# Patient Record
Sex: FEMALE | Race: WHITE | NOT HISPANIC OR LATINO | Employment: FULL TIME | URBAN - NONMETROPOLITAN AREA
[De-identification: names, ages, dates, MRNs, and addresses within clinical notes are randomized per-mention and may not be internally consistent; named-entity substitution may affect disease eponyms.]

---

## 2017-02-28 ENCOUNTER — OFFICE VISIT (OUTPATIENT)
Dept: FAMILY MEDICINE CLINIC | Facility: CLINIC | Age: 52
End: 2017-02-28

## 2017-02-28 VITALS
BODY MASS INDEX: 33.99 KG/M2 | OXYGEN SATURATION: 98 % | HEART RATE: 69 BPM | WEIGHT: 204 LBS | HEIGHT: 65 IN | DIASTOLIC BLOOD PRESSURE: 80 MMHG | SYSTOLIC BLOOD PRESSURE: 122 MMHG

## 2017-02-28 DIAGNOSIS — M54.9 ACUTE MIDLINE BACK PAIN, UNSPECIFIED BACK LOCATION: Primary | ICD-10-CM

## 2017-02-28 DIAGNOSIS — M62.830 BACK SPASM: ICD-10-CM

## 2017-02-28 PROCEDURE — 99213 OFFICE O/P EST LOW 20 MIN: CPT | Performed by: FAMILY MEDICINE

## 2017-02-28 RX ORDER — NAPROXEN 375 MG/1
375 TABLET ORAL 2 TIMES DAILY WITH MEALS
Qty: 60 TABLET | Refills: 1 | Status: SHIPPED | OUTPATIENT
Start: 2017-02-28 | End: 2017-08-17

## 2017-02-28 RX ORDER — METHOCARBAMOL 500 MG/1
500 TABLET, FILM COATED ORAL 3 TIMES DAILY
Qty: 42 TABLET | Refills: 1 | Status: SHIPPED | OUTPATIENT
Start: 2017-02-28 | End: 2017-08-17

## 2017-02-28 NOTE — PROGRESS NOTES
Subjective   Jefferson Pena is a 51 y.o. female.     History of Present Illness The patient  presents with complaints of back pain.  It occurs when she stands.  She is having some tightness in her back in the upper lumbar area and lower thoracic area.  No history of injury    The following portions of the patient's history were reviewed and updated as appropriate: allergies, current medications, past family history, past medical history, past social history, past surgical history and problem list.    Review of Systems   Constitutional: Negative for fatigue and fever.   Respiratory: Negative for chest tightness, shortness of breath and wheezing.    Cardiovascular: Negative for chest pain, palpitations and leg swelling.   Musculoskeletal: Positive for back pain and myalgias.       Objective   Physical Exam   Constitutional: She appears well-developed and well-nourished.   HENT:   Head: Normocephalic and atraumatic.   Right Ear: External ear normal.   Left Ear: External ear normal.   Nose: Nose normal.   Mouth/Throat: Oropharynx is clear and moist.   Eyes: EOM are normal. Pupils are equal, round, and reactive to light.   Neck: Normal range of motion. Neck supple.   Cardiovascular: Normal rate, regular rhythm and normal heart sounds.  Exam reveals no gallop and no friction rub.    No murmur heard.  Pulmonary/Chest: Effort normal and breath sounds normal. No respiratory distress. She has no wheezes.   Abdominal: Soft.   Musculoskeletal:   There is tightness in the paraspinous muscles of the lower thoracic and upper lumbar area on the right.  It also hurts in those areas as well.   Skin: Skin is warm and dry.   Vitals reviewed.      Assessment/Plan   Jefferson was seen today for balance issues.    Diagnoses and all orders for this visit:    Acute midline back pain, unspecified back location    Back spasm    Other orders  -     naproxen (NAPROSYN) 375 MG tablet; Take 1 tablet by mouth 2 (Two) Times a Day With Meals.  -      methocarbamol (ROBAXIN) 500 MG tablet; Take 1 tablet by mouth 3 (Three) Times a Day.     Return in 6 weeks.  Back to work on Thurs.

## 2017-04-10 ENCOUNTER — APPOINTMENT (OUTPATIENT)
Dept: GENERAL RADIOLOGY | Facility: HOSPITAL | Age: 52
End: 2017-04-10

## 2017-04-10 ENCOUNTER — HOSPITAL ENCOUNTER (EMERGENCY)
Facility: HOSPITAL | Age: 52
Discharge: HOME | End: 2017-04-10
Attending: FAMILY MEDICINE | Admitting: FAMILY MEDICINE

## 2017-04-10 VITALS
OXYGEN SATURATION: 97 % | HEIGHT: 65 IN | BODY MASS INDEX: 35.99 KG/M2 | TEMPERATURE: 98.1 F | RESPIRATION RATE: 16 BRPM | HEART RATE: 68 BPM | SYSTOLIC BLOOD PRESSURE: 139 MMHG | DIASTOLIC BLOOD PRESSURE: 78 MMHG | WEIGHT: 216 LBS
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
## 2017-04-10 DIAGNOSIS — S40.011A CONTUSION OF RIGHT SHOULDER, INITIAL ENCOUNTER: Primary | ICD-10-CM

## 2017-04-10 PROCEDURE — 99284 EMERGENCY DEPT VISIT MOD MDM: CPT

## 2017-04-10 PROCEDURE — 73030 X-RAY EXAM OF SHOULDER: CPT

## 2017-04-10 RX ORDER — TRAMADOL HYDROCHLORIDE 50 MG/1
50 TABLET ORAL ONCE
Status: COMPLETED | OUTPATIENT
Start: 2017-04-10 | End: 2017-04-10

## 2017-04-10 RX ORDER — MULTIPLE VITAMINS W/ MINERALS TAB 9MG-400MCG
1 TAB ORAL DAILY
COMMUNITY

## 2017-04-10 RX ORDER — TRAMADOL HYDROCHLORIDE 50 MG/1
50 TABLET ORAL EVERY 6 HOURS PRN
Qty: 20 TABLET | Refills: 0 | Status: SHIPPED | OUTPATIENT
Start: 2017-04-10 | End: 2017-08-17

## 2017-04-10 RX ADMIN — TRAMADOL HYDROCHLORIDE 50 MG: 50 TABLET, FILM COATED ORAL at 20:12

## 2017-04-11 ENCOUNTER — OFFICE VISIT (OUTPATIENT)
Dept: FAMILY MEDICINE CLINIC | Facility: CLINIC | Age: 52
End: 2017-04-11

## 2017-04-11 VITALS
HEART RATE: 74 BPM | SYSTOLIC BLOOD PRESSURE: 122 MMHG | WEIGHT: 210 LBS | HEIGHT: 65 IN | OXYGEN SATURATION: 98 % | BODY MASS INDEX: 34.99 KG/M2 | DIASTOLIC BLOOD PRESSURE: 80 MMHG

## 2017-04-11 DIAGNOSIS — M79.671 FOOT PAIN, RIGHT: Primary | ICD-10-CM

## 2017-04-11 DIAGNOSIS — T14.8XXA BRUISE: ICD-10-CM

## 2017-04-11 PROCEDURE — 99213 OFFICE O/P EST LOW 20 MIN: CPT | Performed by: FAMILY MEDICINE

## 2017-04-11 NOTE — PROGRESS NOTES
Subjective   Jefferson Pena is a 52 y.o. female.     History of Present Illness   Patient comes in for check of recent injury at work.  She was seen in the emergency department yesterday.  X-rays of the upper extremity and lower extremity were obtained.  She was still sore.  Her right foot hurts.  She has a bruise around the second toe on the right foot.    The following portions of the patient's history were reviewed and updated as appropriate: allergies, current medications, past family history, past medical history, past social history, past surgical history and problem list.    Review of Systems   Cardiovascular: Negative for chest pain, palpitations and leg swelling.   Musculoskeletal: Positive for arthralgias.       Objective   Physical Exam   Constitutional: She appears well-developed and well-nourished.   HENT:   Head: Normocephalic and atraumatic.   Right Ear: External ear normal.   Left Ear: External ear normal.   Nose: Nose normal.   Mouth/Throat: Oropharynx is clear and moist.   Eyes: EOM are normal. Pupils are equal, round, and reactive to light.   Neck: Normal range of motion.   Cardiovascular: Normal rate, regular rhythm and normal heart sounds.  Exam reveals no gallop and no friction rub.    No murmur heard.  Pulmonary/Chest: Effort normal and breath sounds normal. No respiratory distress. She has no wheezes. She has no rales.   Abdominal: Soft. Bowel sounds are normal.   Musculoskeletal:   Tenderness involving the right shoulder and hip.  There is bruising on the second toe of the right foot.   Vitals reviewed.      Assessment/Plan   Jefferson was seen today for follow-up, back pain and fall.    Diagnoses and all orders for this visit:    Foot pain, right  -     XR Foot 3+ View Right; Future    Bruise     I will contact with x-ray.She is to return to work on Thursday the 11 of April.

## 2017-04-11 NOTE — ED PROVIDER NOTES
Subjective   Patient is a 52 y.o. female presenting with fall.   Fall   Mechanism of injury: fall    Injury location:  Leg and shoulder/arm  Shoulder/arm injury location:  R shoulder  Leg injury location:  R upper leg  Incident location:  Work  Time since incident:  5 hours  Arrived directly from scene: no    Fall:     Fall occurred:  Down stairs (3-4 stairs)    Impact surface:  Hard floor  Suspicion of alcohol use: no    Suspicion of drug use: no    Associated symptoms: no abdominal pain, no back pain, no blindness, no chest pain, no difficulty breathing, no headaches, no hearing loss, no loss of consciousness, no nausea, no neck pain, no seizures and no vomiting        Review of Systems   Constitutional: Negative for appetite change, chills, diaphoresis, fatigue and fever.   HENT: Negative for congestion, ear discharge, ear pain, hearing loss, nosebleeds, rhinorrhea, sinus pressure, sore throat and trouble swallowing.    Eyes: Negative for blindness, discharge and redness.   Respiratory: Negative for apnea, cough, chest tightness, shortness of breath and wheezing.    Cardiovascular: Negative for chest pain.   Gastrointestinal: Negative for abdominal pain, diarrhea, nausea and vomiting.   Endocrine: Negative for polyuria.   Genitourinary: Negative for dysuria, frequency and urgency.   Musculoskeletal: Positive for myalgias. Negative for back pain and neck pain.   Skin: Negative for color change and rash.   Allergic/Immunologic: Negative for immunocompromised state.   Neurological: Negative for dizziness, seizures, loss of consciousness, syncope, weakness, light-headedness and headaches.   Hematological: Negative for adenopathy. Does not bruise/bleed easily.   Psychiatric/Behavioral: Negative for behavioral problems and confusion.   All other systems reviewed and are negative.      Past Medical History:   Diagnosis Date   • Allergic rhinitis    • Asymptomatic varicose veins of bilateral lower extremities    •  Cellulitis    • Cough    • Elevated blood pressure reading without diagnosis of hypertension    • Encounter for surgical aftercare following surgery on the circulatory system     LLE Stab Phlebectomies (24) 03/25/16      • H/O mammogram 07/14/2005    may represent a small cysy or a fibroadenoma,asymmetric density represent normal parenchyma   • Hordeolum externum right lower eyelid    • Obesity    • Other mucopurulent conjunctivitis, right eye    • Pain In Left Leg    • Peripheral venous insufficiency    • Upper respiratory infection    • Varicose veins of left lower leg with ulcer and inflammation    • Venous insufficiency     chronic) (peripheral) - Wears Compression stockings          Allergies   Allergen Reactions   • Penicillins    • Sulfa Antibiotics        Past Surgical History:   Procedure Laterality Date   • COLOSTOMY     • HYSTERECTOMY     • PAP SMEAR  06/03/2004    normal   • SKIN BIOPSY     • VASCULAR SURGERY  02/02/2016   • VEIN SURGERY  03/25/2016    Left lower extremity stab phlebectomies   • WRIST SURGERY  06/02/1982    excision, ganglion cyst,dorsum of the right        Family History   Problem Relation Age of Onset   • Hypertension Mother    • Hypertension Father    • Breast cancer Maternal Grandmother    • Stroke Other    • Hyperlipidemia Other    • Heart disease Other    • Thyroid disease Other        Social History     Social History   • Marital status:      Spouse name: N/A   • Number of children: N/A   • Years of education: N/A     Social History Main Topics   • Smoking status: Never Smoker   • Smokeless tobacco: Never Used   • Alcohol use No   • Drug use: No   • Sexual activity: Not Asked     Other Topics Concern   • None     Social History Narrative   • None           Objective   Physical Exam   Constitutional: She is oriented to person, place, and time. She appears well-developed and well-nourished.   HENT:   Head: Normocephalic and atraumatic.   Nose: Nose normal.   Mouth/Throat:  Oropharynx is clear and moist.   Eyes: Conjunctivae and EOM are normal. Pupils are equal, round, and reactive to light. Right eye exhibits no discharge. Left eye exhibits no discharge. No scleral icterus.   Neck: Normal range of motion. Neck supple. No tracheal deviation present.   Cardiovascular: Normal rate, regular rhythm and normal heart sounds.    No murmur heard.  Pulmonary/Chest: Effort normal and breath sounds normal. No stridor. No respiratory distress. She has no wheezes. She has no rales.   Abdominal: Soft. Bowel sounds are normal. She exhibits no distension and no mass. There is no tenderness. There is no rebound and no guarding.   Musculoskeletal: She exhibits tenderness. She exhibits no edema.        Right shoulder: She exhibits tenderness.   Neurological: She is alert and oriented to person, place, and time. Coordination normal.   Skin: Skin is warm and dry. No rash noted. No erythema.   Psychiatric: She has a normal mood and affect. Her behavior is normal. Thought content normal.   Nursing note and vitals reviewed.      Procedures         ED Course  ED Course          Labs Reviewed - No data to display    XR Shoulder 2+ View Right   Final Result   CONCLUSION: No acute bony abnormality.      Electronically signed by:  Laron Kunz  4/10/2017 9:13 PM   CDT Workstation: [id number]                    [facility]    Final diagnoses:   Contusion of right shoulder, initial encounter   Fall, initial encounter            Florencio Lopez MD  04/10/17 [id number]

## 2017-04-20 ENCOUNTER — APPOINTMENT (OUTPATIENT)
Dept: LAB | Facility: HOSPITAL | Age: 52
End: 2017-04-20

## 2017-04-20 ENCOUNTER — OFFICE VISIT (OUTPATIENT)
Dept: FAMILY MEDICINE CLINIC | Facility: CLINIC | Age: 52
End: 2017-04-20

## 2017-04-20 VITALS
HEART RATE: 76 BPM | BODY MASS INDEX: 34.82 KG/M2 | DIASTOLIC BLOOD PRESSURE: 80 MMHG | OXYGEN SATURATION: 96 % | HEIGHT: 65 IN | WEIGHT: 209 LBS | SYSTOLIC BLOOD PRESSURE: 122 MMHG

## 2017-04-20 DIAGNOSIS — S89.92XS KNEE INJURY, LEFT, SEQUELA: ICD-10-CM

## 2017-04-20 DIAGNOSIS — Z12.11 COLON CANCER SCREENING: Primary | ICD-10-CM

## 2017-04-20 DIAGNOSIS — S89.91XS KNEE INJURY, RIGHT, SEQUELA: ICD-10-CM

## 2017-04-20 DIAGNOSIS — S49.92XD SHOULDER INJURY, LEFT, SUBSEQUENT ENCOUNTER: ICD-10-CM

## 2017-04-20 LAB
ALBUMIN SERPL-MCNC: 4.6 G/DL (ref 3.4–4.8)
ALBUMIN/GLOB SERPL: 1.3 G/DL (ref 1.1–1.8)
ALP SERPL-CCNC: 93 U/L (ref 38–126)
ALT SERPL W P-5'-P-CCNC: 18 U/L (ref 9–52)
ANION GAP SERPL CALCULATED.3IONS-SCNC: 14 MMOL/L (ref 5–15)
AST SERPL-CCNC: 40 U/L (ref 14–36)
BASOPHILS # BLD AUTO: 0.03 10*3/MM3 (ref 0–0.2)
BASOPHILS NFR BLD AUTO: 0.4 % (ref 0–2)
BILIRUB SERPL-MCNC: 0.6 MG/DL (ref 0.2–1.3)
BUN BLD-MCNC: 22 MG/DL (ref 7–21)
BUN/CREAT SERPL: 21.8 (ref 7–25)
CALCIUM SPEC-SCNC: 8.9 MG/DL (ref 8.4–10.2)
CHLORIDE SERPL-SCNC: 103 MMOL/L (ref 95–110)
CO2 SERPL-SCNC: 23 MMOL/L (ref 22–31)
CREAT BLD-MCNC: 1.01 MG/DL (ref 0.5–1)
DEPRECATED RDW RBC AUTO: 48.7 FL (ref 36.4–46.3)
EOSINOPHIL # BLD AUTO: 0.19 10*3/MM3 (ref 0–0.7)
EOSINOPHIL NFR BLD AUTO: 2.5 % (ref 0–7)
ERYTHROCYTE [DISTWIDTH] IN BLOOD BY AUTOMATED COUNT: 14.3 % (ref 11.5–14.5)
GFR SERPL CREATININE-BSD FRML MDRD: 58 ML/MIN/1.73 (ref 51–120)
GLOBULIN UR ELPH-MCNC: 3.5 GM/DL (ref 2.3–3.5)
GLUCOSE BLD-MCNC: 94 MG/DL (ref 60–100)
HCT VFR BLD AUTO: 36.4 % (ref 35–45)
HGB BLD-MCNC: 12 G/DL (ref 12–15.5)
IMM GRANULOCYTES # BLD: 0.01 10*3/MM3 (ref 0–0.02)
IMM GRANULOCYTES NFR BLD: 0.1 % (ref 0–0.5)
LYMPHOCYTES # BLD AUTO: 1.84 10*3/MM3 (ref 0.6–4.2)
LYMPHOCYTES NFR BLD AUTO: 24.2 % (ref 10–50)
MCH RBC QN AUTO: 30.5 PG (ref 26.5–34)
MCHC RBC AUTO-ENTMCNC: 33 G/DL (ref 31.4–36)
MCV RBC AUTO: 92.6 FL (ref 80–98)
MONOCYTES # BLD AUTO: 0.47 10*3/MM3 (ref 0–0.9)
MONOCYTES NFR BLD AUTO: 6.2 % (ref 0–12)
NEUTROPHILS # BLD AUTO: 5.06 10*3/MM3 (ref 2–8.6)
NEUTROPHILS NFR BLD AUTO: 66.6 % (ref 37–80)
PLATELET # BLD AUTO: 201 10*3/MM3 (ref 150–450)
PMV BLD AUTO: 12.4 FL (ref 8–12)
POTASSIUM BLD-SCNC: 3.8 MMOL/L (ref 3.5–5.1)
PROT SERPL-MCNC: 8.1 G/DL (ref 6.3–8.6)
RBC # BLD AUTO: 3.93 10*6/MM3 (ref 3.77–5.16)
SODIUM BLD-SCNC: 140 MMOL/L (ref 137–145)
WBC NRBC COR # BLD: 7.6 10*3/MM3 (ref 3.2–9.8)

## 2017-04-20 PROCEDURE — 80053 COMPREHEN METABOLIC PANEL: CPT | Performed by: FAMILY MEDICINE

## 2017-04-20 PROCEDURE — 85025 COMPLETE CBC W/AUTO DIFF WBC: CPT | Performed by: FAMILY MEDICINE

## 2017-04-20 PROCEDURE — 36415 COLL VENOUS BLD VENIPUNCTURE: CPT | Performed by: FAMILY MEDICINE

## 2017-04-20 PROCEDURE — 99214 OFFICE O/P EST MOD 30 MIN: CPT | Performed by: FAMILY MEDICINE

## 2017-04-20 NOTE — PATIENT INSTRUCTIONS
Return in 3 months.  Contact office if no contact from Dr. Lance's office.  Continue with current meds.

## 2017-04-20 NOTE — PROGRESS NOTES
Subjective   Jefferson Pena is a 52 y.o. female.     History of Present Illness   The patient is in for check of shoulder and leg pain.  She had fallen and injured herself.    The following portions of the patient's history were reviewed and updated as appropriate: allergies, current medications, past family history, past medical history, past social history, past surgical history and problem list.    Review of Systems   Constitutional: Negative for fatigue and fever.   Respiratory: Negative for cough, chest tightness and stridor.    Cardiovascular: Negative for chest pain, palpitations and leg swelling.   Musculoskeletal: Positive for arthralgias and joint swelling. Negative for back pain, gait problem and myalgias.       Objective   Physical Exam   Constitutional: She appears well-developed and well-nourished.   HENT:   Head: Normocephalic and atraumatic.   Right Ear: External ear normal.   Left Ear: External ear normal.   Nose: Nose normal.   Mouth/Throat: Oropharynx is clear and moist.   Eyes: EOM are normal. Pupils are equal, round, and reactive to light.   Neck: Normal range of motion.   Cardiovascular: Normal rate, regular rhythm and normal heart sounds.  Exam reveals no gallop and no friction rub.    No murmur heard.  Pulmonary/Chest: Effort normal and breath sounds normal. No respiratory distress. She has no wheezes. She has no rales.   Abdominal: Soft. Bowel sounds are normal. She exhibits no distension. There is no tenderness.   Musculoskeletal:   There is some bruising on the left knee that goes back to the time of injury.  Both knees are  but better than previous exams.  The shoulder has good range of motion . It is still sore.   Vitals reviewed.      Assessment/Plan   Jefferson was seen today for follow-up.    Diagnoses and all orders for this visit:    Colon cancer screening  -     Ambulatory Referral to Gastroenterology    Shoulder injury, left, subsequent encounter    Knee injury, right,  sequela    Knee injury, left, sequela    Referral for colonoscopy with Dr. Lance as per patient preference.  Continue with ice for swelling as discussed.  Return in 3 months.

## 2017-04-25 ENCOUNTER — OFFICE VISIT (OUTPATIENT)
Dept: FAMILY MEDICINE CLINIC | Facility: CLINIC | Age: 52
End: 2017-04-25

## 2017-04-25 VITALS
DIASTOLIC BLOOD PRESSURE: 80 MMHG | HEART RATE: 81 BPM | OXYGEN SATURATION: 98 % | BODY MASS INDEX: 34.82 KG/M2 | SYSTOLIC BLOOD PRESSURE: 122 MMHG | WEIGHT: 209 LBS | HEIGHT: 65 IN

## 2017-04-25 DIAGNOSIS — M25.362 UNSTABLE KNEE, LEFT: ICD-10-CM

## 2017-04-25 DIAGNOSIS — M25.562 ACUTE PAIN OF LEFT KNEE: Primary | ICD-10-CM

## 2017-04-25 PROCEDURE — 99213 OFFICE O/P EST LOW 20 MIN: CPT | Performed by: FAMILY MEDICINE

## 2017-04-25 NOTE — PROGRESS NOTES
Subjective   Jefferson Pena is a 52 y.o. female.     History of Present Illness The patient comes in check of her left leg.  She states it is giving out on her and is swollen and tender.She had fallen recently and injured the other knee.    The following portions of the patient's history were reviewed and updated as appropriate: allergies, current medications, past family history, past medical history, past social history, past surgical history and problem list.    Review of Systems   Musculoskeletal: Positive for arthralgias, joint swelling and myalgias.       Objective   Physical Exam   Constitutional: She appears well-developed and well-nourished.   HENT:   Head: Normocephalic and atraumatic.   Right Ear: External ear normal.   Left Ear: External ear normal.   Nose: Nose normal.   Mouth/Throat: Oropharynx is clear and moist.   Eyes: EOM are normal. Pupils are equal, round, and reactive to light.   Neck: Normal range of motion.   Cardiovascular: Normal rate, regular rhythm and normal heart sounds.  Exam reveals no gallop and no friction rub.    No murmur heard.  Pulmonary/Chest: Effort normal and breath sounds normal. No respiratory distress. She has no wheezes. She has no rales.   Abdominal: Soft. Bowel sounds are normal. She exhibits no distension. There is no tenderness.   Musculoskeletal:   Left knee is mildly tender. It feels like it gives on pulling forward..   Vitals reviewed.      Assessment/Plan   Jefferson was seen today for leg pain and leg swelling.    Diagnoses and all orders for this visit:    Acute pain of left knee  -     XR Knee 3 View Left; Future    Unstable knee, left    Appt. With Dr. Espinoza on 04/27/17 at 1:30  Will contact with result. Off work for one week.

## 2017-04-27 ENCOUNTER — OFFICE VISIT (OUTPATIENT)
Dept: ORTHOPEDIC SURGERY | Facility: CLINIC | Age: 52
End: 2017-04-27

## 2017-04-27 VITALS
BODY MASS INDEX: 34.82 KG/M2 | SYSTOLIC BLOOD PRESSURE: 126 MMHG | HEIGHT: 65 IN | DIASTOLIC BLOOD PRESSURE: 80 MMHG | WEIGHT: 209 LBS

## 2017-04-27 DIAGNOSIS — M25.562 ACUTE PAIN OF LEFT KNEE: Primary | ICD-10-CM

## 2017-04-27 PROCEDURE — 99203 OFFICE O/P NEW LOW 30 MIN: CPT | Performed by: ORTHOPAEDIC SURGERY

## 2017-04-27 RX ORDER — MELOXICAM 15 MG/1
15 TABLET ORAL DAILY
Qty: 30 TABLET | Refills: 3 | Status: SHIPPED | OUTPATIENT
Start: 2017-04-27 | End: 2017-08-17

## 2017-04-27 NOTE — PROGRESS NOTES
Subjective   Jefferson Pena is a 52 y.o. female. Left knee injury-WORK[employer]    History of Present Illness Patient is an employee at [employer]. Patient states that she fell down stairs at work on 4-. Patient was seen by Dr. Perez and referred here. Patient states that her left knee joint feels unstable when she is getting up and down.     The following portions of the patient's history were reviewed and updated as appropriate:   She  has a past medical history of Allergic rhinitis; Asymptomatic varicose veins of bilateral lower extremities; Cellulitis; Cough; Elevated blood pressure reading without diagnosis of hypertension; Encounter for surgical aftercare following surgery on the circulatory system; H/O mammogram (07/14/2005); Hordeolum externum right lower eyelid; Obesity; Other mucopurulent conjunctivitis, right eye; Pain In Left Leg; Peripheral venous insufficiency; Upper respiratory infection; Varicose veins of left lower leg with ulcer and inflammation; and Venous insufficiency.  She  does not have any pertinent problems on file.  She  has a past surgical history that includes Skin biopsy; Colostomy; Vascular surgery (02/02/2016); Pap Smear (06/03/2004); Vein Surgery (03/25/2016); Wrist surgery (06/02/1982); and Hysterectomy.  Her family history includes Breast cancer in her maternal grandmother; Heart disease in her other; Hyperlipidemia in her other; Hypertension in her father and mother; Stroke in her other; Thyroid disease in her other.  She  reports that she has never smoked. She has never used smokeless tobacco. She reports that she does not drink alcohol or use illicit drugs.  Current Outpatient Prescriptions   Medication Sig Dispense Refill   • methocarbamol (ROBAXIN) 500 MG tablet Take 1 tablet by mouth 3 (Three) Times a Day. 42 tablet 1   • Multiple Vitamins-Minerals (MULTIVITAMIN WITH MINERALS) tablet tablet Take 1 tablet by mouth Daily.     • naproxen (NAPROSYN) 375 MG  "tablet Take 1 tablet by mouth 2 (Two) Times a Day With Meals. 60 tablet 1   • traMADol (ULTRAM) 50 MG tablet Take 1 tablet by mouth Every 6 (Six) Hours As Needed for Moderate Pain (4-6). 20 tablet 0   • meloxicam (MOBIC) 15 MG tablet Take 1 tablet by mouth Daily. Take with meal daily 30 tablet 3     No current facility-administered medications for this visit.      Current Outpatient Prescriptions on File Prior to Visit   Medication Sig   • methocarbamol (ROBAXIN) 500 MG tablet Take 1 tablet by mouth 3 (Three) Times a Day.   • Multiple Vitamins-Minerals (MULTIVITAMIN WITH MINERALS) tablet tablet Take 1 tablet by mouth Daily.   • naproxen (NAPROSYN) 375 MG tablet Take 1 tablet by mouth 2 (Two) Times a Day With Meals.   • traMADol (ULTRAM) 50 MG tablet Take 1 tablet by mouth Every 6 (Six) Hours As Needed for Moderate Pain (4-6).     No current facility-administered medications on file prior to visit.      She is allergic to penicillins and sulfa antibiotics..    Review of Systems  /80  Ht 65\" (165.1 cm)  Wt 209 lb (94.8 kg)  BMI 34.78 kg/m2    Social History     Social History   • Marital status:      Spouse name: N/A   • Number of children: N/A   • Years of education: N/A     Occupational History   • Not on file.     Social History Main Topics   • Smoking status: Never Smoker   • Smokeless tobacco: Never Used   • Alcohol use No   • Drug use: No   • Sexual activity: Not on file     Other Topics Concern   • Not on file     Social History Narrative       HEENT: Normocephalic.  PERRLA.  TM's clear bilaterally.  Oropharynx: Clear.  Neck: Supple, with no adenopathy.  Chest: Equal bilateral expansion.  Clear to auscultation and percussion.  Heart: Regular sinus rhythm, S1 and S2 normal.  No murmurs or extra heart sounds heard.  Abdomen: Soft, nontender, and no organomegaly.  Neurological: cranial nerves II-XII normal Vascular: pulses are present  Dermatological: no rashes  or blemishes, or any abnormality of " the skin.    REVIEW OF SYSTEMS:  Negative, other than presenting complaint.  HEENT: No headaches, diplopia, blurred vision, tinnitus, vertigo, epistaxis, hoarseness or sore throat.  Pulmonary: No cough, sputum, hemoptysis, dyspnea, wheezing, or chest pain.  Cardiac: No chest pain, palpitations, orthopnea, paroxysmal nocturnal dyspnea, shortness of breath, or pedal edema.  Gastrointestinal: No diarrhea, melena, or constipation.  Genitourinary: No dysuria, hematuria, nocturia, frequency, bladder or bowel incontinence.  Hematology: No history of any anemia, fatigue, fever, or chills or night sweats.  Dermatology: No rashes, pruritus, or increased pigmentation changes of the skin.   Objective   Physical Exam exam of the left knee shows crepitus and discomfort on palpation of the medial  Joint line, negative [facility]'s  Neuro intact. Quads:4/5    Assessment/Plan sprain of the knee with exacerbation of OA  Of the knee joint. Would like to start patient on meloxicam , begin PT and pre-cer patient for synvisc injection of the knee  To alleviate the inflammation due to the injury and improve her function and ability to continue working.    Jefferson was seen today for pain.    Diagnoses and all orders for this visit:    Acute pain of left knee  -     meloxicam (MOBIC) 15 MG tablet; Take 1 tablet by mouth Daily. Take with meal daily  -     Ambulatory Referral to Physical Therapy Evaluate and treat

## 2017-05-04 ENCOUNTER — HOSPITAL ENCOUNTER (OUTPATIENT)
Dept: PHYSICAL THERAPY | Facility: HOSPITAL | Age: 52
Setting detail: THERAPIES SERIES
Discharge: HOME | End: 2017-05-04

## 2017-05-04 ENCOUNTER — APPOINTMENT (OUTPATIENT)
Dept: PHYSICAL THERAPY | Facility: HOSPITAL | Age: 52
End: 2017-05-04

## 2017-05-04 DIAGNOSIS — M25.562 ACUTE PAIN OF LEFT KNEE: Primary | ICD-10-CM

## 2017-05-04 PROCEDURE — 97161 PT EVAL LOW COMPLEX 20 MIN: CPT | Performed by: PHYSICAL THERAPIST

## 2017-05-04 PROCEDURE — 97110 THERAPEUTIC EXERCISES: CPT | Performed by: PHYSICAL THERAPIST

## 2017-05-09 ENCOUNTER — HOSPITAL ENCOUNTER (OUTPATIENT)
Dept: PHYSICAL THERAPY | Facility: HOSPITAL | Age: 52
Setting detail: THERAPIES SERIES
Discharge: HOME | End: 2017-05-09

## 2017-05-09 DIAGNOSIS — M25.562 ACUTE PAIN OF LEFT KNEE: Primary | ICD-10-CM

## 2017-05-09 PROCEDURE — 97110 THERAPEUTIC EXERCISES: CPT | Performed by: PHYSICAL THERAPIST

## 2017-05-11 ENCOUNTER — APPOINTMENT (OUTPATIENT)
Dept: PHYSICAL THERAPY | Facility: HOSPITAL | Age: 52
End: 2017-05-11

## 2017-05-12 ENCOUNTER — CLINICAL SUPPORT (OUTPATIENT)
Dept: ORTHOPEDIC SURGERY | Facility: CLINIC | Age: 52
End: 2017-05-12

## 2017-05-12 VITALS
WEIGHT: 210 LBS | SYSTOLIC BLOOD PRESSURE: 126 MMHG | DIASTOLIC BLOOD PRESSURE: 80 MMHG | HEIGHT: 65 IN | BODY MASS INDEX: 34.99 KG/M2

## 2017-05-12 DIAGNOSIS — M17.12 PRIMARY OSTEOARTHRITIS OF LEFT KNEE: ICD-10-CM

## 2017-05-12 DIAGNOSIS — M25.562 ACUTE PAIN OF LEFT KNEE: Primary | ICD-10-CM

## 2017-05-12 PROCEDURE — 99024 POSTOP FOLLOW-UP VISIT: CPT | Performed by: ORTHOPAEDIC SURGERY

## 2017-05-12 PROCEDURE — 20610 DRAIN/INJ JOINT/BURSA W/O US: CPT | Performed by: ORTHOPAEDIC SURGERY

## 2017-05-15 ENCOUNTER — HOSPITAL ENCOUNTER (OUTPATIENT)
Dept: PHYSICAL THERAPY | Facility: HOSPITAL | Age: 52
Setting detail: THERAPIES SERIES
Discharge: HOME | End: 2017-05-15

## 2017-05-15 DIAGNOSIS — M25.562 ACUTE PAIN OF LEFT KNEE: Primary | ICD-10-CM

## 2017-05-15 PROCEDURE — 97110 THERAPEUTIC EXERCISES: CPT

## 2017-05-15 PROCEDURE — G0283 ELEC STIM OTHER THAN WOUND: HCPCS

## 2017-05-17 ENCOUNTER — HOSPITAL ENCOUNTER (OUTPATIENT)
Dept: PHYSICAL THERAPY | Facility: HOSPITAL | Age: 52
Setting detail: THERAPIES SERIES
Discharge: HOME | End: 2017-05-17

## 2017-05-17 DIAGNOSIS — M25.562 ACUTE PAIN OF LEFT KNEE: Primary | ICD-10-CM

## 2017-05-17 PROCEDURE — 97110 THERAPEUTIC EXERCISES: CPT

## 2017-05-23 ENCOUNTER — HOSPITAL ENCOUNTER (OUTPATIENT)
Dept: PHYSICAL THERAPY | Facility: HOSPITAL | Age: 52
Setting detail: THERAPIES SERIES
Discharge: HOME | End: 2017-05-23

## 2017-05-23 DIAGNOSIS — M25.562 ACUTE PAIN OF LEFT KNEE: Primary | ICD-10-CM

## 2017-05-23 PROCEDURE — 97110 THERAPEUTIC EXERCISES: CPT

## 2017-05-25 ENCOUNTER — HOSPITAL ENCOUNTER (OUTPATIENT)
Dept: PHYSICAL THERAPY | Facility: HOSPITAL | Age: 52
Setting detail: THERAPIES SERIES
Discharge: HOME | End: 2017-05-25

## 2017-05-25 DIAGNOSIS — M25.562 ACUTE PAIN OF LEFT KNEE: Primary | ICD-10-CM

## 2017-05-25 PROCEDURE — 97110 THERAPEUTIC EXERCISES: CPT

## 2017-06-01 ENCOUNTER — HOSPITAL ENCOUNTER (OUTPATIENT)
Dept: PHYSICAL THERAPY | Facility: HOSPITAL | Age: 52
Setting detail: THERAPIES SERIES
Discharge: HOME | End: 2017-06-01

## 2017-06-01 DIAGNOSIS — M25.562 ACUTE PAIN OF LEFT KNEE: Primary | ICD-10-CM

## 2017-06-01 PROCEDURE — 97110 THERAPEUTIC EXERCISES: CPT | Performed by: PHYSICAL THERAPIST

## 2017-06-01 NOTE — THERAPY RE-EVALUATION
Outpatient Physical Therapy Ortho Progress Note  [facility] [place]  Nini Oneil, PT       Patient Name: Jefferson Pena  : 1965  MRN: [id number]  Today's Date: 2017      Visit Date: 2017     Subjective Improvement: 50%      Attendance:   Approved:  12 visits         MD follow up:    date:     17      Patient Active Problem List   Diagnosis   • Acute pain of left knee        Past Medical History:   Diagnosis Date   • Allergic rhinitis    • Asymptomatic varicose veins of bilateral lower extremities    • Cellulitis    • Cough    • Elevated blood pressure reading without diagnosis of hypertension    • Encounter for surgical aftercare following surgery on the circulatory system     LLE Stab Phlebectomies (24) 16      • H/O mammogram 2005    may represent a small cysy or a fibroadenoma,asymmetric density represent normal parenchyma   • Hordeolum externum right lower eyelid    • Obesity    • Other mucopurulent conjunctivitis, right eye    • Pain In Left Leg    • Peripheral venous insufficiency    • Upper respiratory infection    • Varicose veins of left lower leg with ulcer and inflammation    • Venous insufficiency     chronic) (peripheral) - Wears Compression stockings           Past Surgical History:   Procedure Laterality Date   • COLOSTOMY     • HYSTERECTOMY     • PAP SMEAR  2004    normal   • SKIN BIOPSY     • VASCULAR SURGERY  2016   • VEIN SURGERY  2016    Left lower extremity stab phlebectomies   • WRIST SURGERY  1982    excision, ganglion cyst,dorsum of the right        Visit Dx:     ICD-10-CM ICD-9-CM   1. Acute pain of left knee M25.562 719.46                 PT Ortho       17 1500    Precautions and Contraindications    Precautions/Limitations no known precautions/limitations  -KG    Subjective Pain    Able to rate subjective pain? yes  -KG    Pre-Treatment Pain Level 0  -KG    Post-Treatment Pain Level 0  -KG     Posture/Observations    Posture/Observations Comments No signs of acute distress. Mild antalgic noted with no AD. Decreased stance time LLE. Pt unsteady with first steps after standing from sitting.  -KG    Sensation    Sensation WNL? WNL  -KG    Light Touch No apparent deficits  -KG    Knee Palpation    Patella Left:;Tender  -KG    Pes Anserine Bursa Left:;Tender  -KG    Medial Joint Line Left:;Tender  -KG    Quads Left:;Tender   Distal insertion  -KG    Patellar Accessory Motions    Patellar Accessory Motions Tested? Yes  -KG    Superior glide WNL  -KG    Inferior glide WNL  -KG    Medial glide WNL  -KG    Lateral glide WNL  -KG    Left Knee    Extension/Flexion AROM Deficit 0-115 deg  -KG    MMT (Manual Muscle Testing)    General MMT Assessment Detail Continues to have slight quad lag with active SLR  -KG    Left Hip    Hip Flexion Gross Movement (4/5) good  -KG    Hip ABduction Gross Movement (4+/5) good plus  -KG    Hip ADduction Gross Movement (5/5) normal  -KG    Left Knee    Knee Extension Gross Movement (4+/5) good plus  -KG    Knee Flexion Gross Movement (4+/5) good plus  -KG      User Key  (r) = Recorded By, (t) = Taken By, (c) = Cosigned By    Initials Name Provider Type    KG Nini Oneil, PT Physical Therapist                            Therapy Education       06/01/17 1500          Therapy Education    Given HEP;Symptoms/condition management  -KG      Program Reinforced  -KG      How Provided Verbal  -KG      Provided to Patient  -KG      Level of Understanding Verbalized;Demonstrated  -KG        User Key  (r) = Recorded By, (t) = Taken By, (c) = Cosigned By    Initials Name Provider Type    KG Nini Oneil, PT Physical Therapist                PT OP Goals       06/01/17 1500       PT Short Term Goals    STG Date to Achieve 05/18/17  -KG     STG 1 Indep in HEP  -KG     STG 1 Progress Met  -KG     STG 2 Demonstrate L hip flex/abd strength to 5/5  -KG     STG 2 Progress Not Met  -KG     STG 3  Demonstrate L knee ext AROM to 0 deg  -KG     STG 3 Progress Met  -KG     STG 4 Demonstrate L knee flex AROM to 120 deg or greater, no pain at end range  -KG     STG 4 Progress Not Met  -KG     STG 5 Demonstrate L  knee flex/ext MMT to 5/5  -KG     STG 5 Progress Not Met  -KG     STG 6 Ascend/descend 1 flight of stairs, step-to pattern with descent, no increased pain  -KG     STG 6 Progress New  -KG     Time Calculation    PT Goal Re-Cert Due Date 06/22/17  -KG       User Key  (r) = Recorded By, (t) = Taken By, (c) = Cosigned By    Initials Name Provider Type    KG Nini Oneil, PT Physical Therapist                PT Assessment/Plan       06/01/17 1500       PT Assessment    Functional Limitations Impaired gait;Limitation in home management;Performance in leisure activities;Performance in work activities  -KG     Impairments Balance;Gait;Muscle strength;Pain;Range of motion  -KG     Assessment Comments Pt progressing well at this time with overall decrease in subjective reports of pain. Demonstrated improved knee flex AROM this date. Able to ascend stairs in reciprocal pattern but uses step-to pattern for descent with slight increase knee pain. Pt continues to demo slight quad lag with active SLR. Pt will benefit from further skilled PT services to further improve hip/knee strength & muscle endurance.  -KG     Rehab Potential Excellent  -KG     Patient/caregiver participated in establishment of treatment plan and goals Yes  -KG     Patient would benefit from skilled therapy intervention Yes  -KG     PT Plan    PT Frequency 2x/week  -KG     Predicted Duration of Therapy Intervention (days/wks) 2-3 weeks  -KG     PT Plan Comments Will continue to see pt through remaining visits and d/c at that time to independent management/HEP. Continue knee ROM and hip/knee strengthening.  -KG       User Key  (r) = Recorded By, (t) = Taken By, (c) = Cosigned By    Initials Name Provider Type    DAR Oneil, PT Physical  "Therapist                Modalities       06/01/17 1500          Ice    Patient reports will apply ice at home to involved area Yes  -KG        User Key  (r) = Recorded By, (t) = Taken By, (c) = Cosigned By    Initials Name Provider Type    KG Nini Oneil, PT Physical Therapist              Exercises       06/01/17 1500          Subjective Comments    Subjective Comments Pt states she can tell therapy is helping. States she's \"prison back to normal probably\". States she is still cautious taking the stairs while at work. Reports she's not having as much pain as she was before starting therapy.  -KG      Subjective Pain    Able to rate subjective pain? yes  -KG      Pre-Treatment Pain Level 0  -KG      Post-Treatment Pain Level 0  -KG      Aquatics    Aquatics performed? No  -KG      Exercise 1    Exercise Name 1 PRO II- seat 7-4.0  -KG      Time (Minutes) 1 10'  -KG      Exercise 2    Exercise Name 2 Incline Calf S   -KG      Sets 2 3  -KG      Time (Seconds) 2 30\"  -KG      Exercise 3    Exercise Name 3 St Ham S  -KG      Sets 3 3  -KG      Time (Seconds) 3 30\"  -KG      Exercise 4    Exercise Name 4 St Knee Flexion S  -KG      Sets 4 3  -KG      Time (Seconds) 4 30\"  -KG      Exercise 5    Exercise Name 5 Reciprocal Stairs 1 flight   Reciprocal ascent, step-to with descent with pain ant knee  -KG      Sets 5 1  -KG      Reps 5 1  -KG      Exercise 6    Exercise Name 6 Airex Standing CR  -KG      Sets 6 1  -KG      Reps 6 30  -KG      Exercise 7    Exercise Name 7 Fwd/Lat Step-Ups 4\" step  -KG      Sets 7 2  -KG      Reps 7 10  -KG      Exercise 8    Exercise Name 8 Seated Hamstring Curls  -KG      Equipment 8 Theraband  -KG      Resistance 8 Red  -KG      Sets 8 2  -KG      Reps 8 10  -KG      Exercise 9    Exercise Name 9 Sup SLR  -KG      Sets 9 2  -KG      Reps 9 10  -KG      Exercise 10    Exercise Name 10 SL Hip Abd  -KG      Sets 10 2  -KG      Reps 10 10  -KG      Exercise 11    Exercise Name 11 Quad " "Set + Heel Prop  -KG      Sets 11 2  -KG      Reps 11 10  -KG      Time (Seconds) 11 5\" hold  -KG        User Key  (r) = Recorded By, (t) = Taken By, (c) = Cosigned By    Initials Name Provider Type    DAR Oneil PT Physical Therapist                              Outcome Measures       06/01/17 1500          Lower Extremity Functional Index    Any of your usual work, housework or school activities 4  -KG      Your usual hobbies, recreational or sporting activities 3  -KG      Getting into or out of the bath 4  -KG      Walking between rooms 4  -KG      Putting on your shoes or socks 3  -KG      Squatting 4  -KG      Lifting an object, like a bag of groceries from the floor 4  -KG      Performing light activities around your home 4  -KG      Performing heavy activities around your home 4  -KG      Getting into or out of a car 4  -KG      Walking 2 blocks 4  -KG      Walking a mile 4  -KG      Going up or down 10 stairs (about 1 flight of stairs) 4  -KG      Standing for 1 hour 4  -KG      Sitting for 1 hour 4  -KG      Running on even ground 4  -KG      Running on uneven ground 4  -KG      Making sharp turns while running fast 4  -KG      Hopping 4  -KG      Rolling over in bed 4  -KG      Total 78  -KG      Functional Assessment    Outcome Measure Options Lower Extremity Functional Scale (LEFS)  -KG        User Key  (r) = Recorded By, (t) = Taken By, (c) = Cosigned By    Initials Name Provider Type    DAR Oneil PT Physical Therapist            Time Calculation:   Start Time: 1504  Stop Time: 1559  Time Calculation (min): 55 min  Total Timed Code Minutes- PT: 55 minute(s)     Therapy Charges for Today     Code Description Service Date Service Provider Modifiers Qty    93790583723  PT THER PROC EA 15 MIN 6/1/2017 Nini Oneil, PT GP 4          PT G-Codes  Outcome Measure Options: Lower Extremity Functional Scale (LEFS)         Nini Oneil, PT  6/1/2017      "

## 2017-06-02 ENCOUNTER — HOSPITAL ENCOUNTER (OUTPATIENT)
Dept: PHYSICAL THERAPY | Facility: HOSPITAL | Age: 52
Setting detail: THERAPIES SERIES
Discharge: HOME | End: 2017-06-02

## 2017-06-02 DIAGNOSIS — M25.562 ACUTE PAIN OF LEFT KNEE: Primary | ICD-10-CM

## 2017-06-02 PROCEDURE — 97110 THERAPEUTIC EXERCISES: CPT

## 2017-06-02 NOTE — THERAPY TREATMENT NOTE
Outpatient Physical Therapy Ortho Treatment Note  [facility]     Patient Name: Jefferson Pena  : 1965  MRN: [id number]  Today's Date: 2017      Visit Date: 2017  Visits 8/8. Judith 17. MD f/dilcia 17. 60% improvement.   Visit Dx:    ICD-10-CM ICD-9-CM   1. Acute pain of left knee M25.562 719.46       Patient Active Problem List   Diagnosis   • Acute pain of left knee        Past Medical History:   Diagnosis Date   • Allergic rhinitis    • Asymptomatic varicose veins of bilateral lower extremities    • Cellulitis    • Cough    • Elevated blood pressure reading without diagnosis of hypertension    • Encounter for surgical aftercare following surgery on the circulatory system     LLE Stab Phlebectomies (24) 16      • H/O mammogram 2005    may represent a small cysy or a fibroadenoma,asymmetric density represent normal parenchyma   • Hordeolum externum right lower eyelid    • Obesity    • Other mucopurulent conjunctivitis, right eye    • Pain In Left Leg    • Peripheral venous insufficiency    • Upper respiratory infection    • Varicose veins of left lower leg with ulcer and inflammation    • Venous insufficiency     chronic) (peripheral) - Wears Compression stockings           Past Surgical History:   Procedure Laterality Date   • COLOSTOMY     • HYSTERECTOMY     • PAP SMEAR  2004    normal   • SKIN BIOPSY     • VASCULAR SURGERY  2016   • VEIN SURGERY  2016    Left lower extremity stab phlebectomies   • WRIST SURGERY  1982    excision, ganglion cyst,dorsum of the right              PT Ortho       17 1500    Subjective Comments    Subjective Comments Pt reports min knee pain today.   -    Precautions and Contraindications    Precautions/Limitations no known precautions/limitations  -    Subjective Pain    Post-Treatment Pain Level 0  -JW    Posture/Observations    Posture/Observations Comments Mild AG.   -JW      17 1500     Precautions and Contraindications    Precautions/Limitations no known precautions/limitations  -KG    Subjective Pain    Able to rate subjective pain? yes  -KG    Pre-Treatment Pain Level 0  -KG    Post-Treatment Pain Level 0  -KG    Posture/Observations    Posture/Observations Comments No signs of acute distress. Mild antalgic noted with no AD. Decreased stance time LLE. Pt unsteady with first steps after standing from sitting.  -KG    Sensation    Sensation WNL? WNL  -KG    Light Touch No apparent deficits  -KG    Knee Palpation    Patella Left:;Tender  -KG    Pes Anserine Bursa Left:;Tender  -KG    Medial Joint Line Left:;Tender  -KG    Quads Left:;Tender   Distal insertion  -KG    Patellar Accessory Motions    Patellar Accessory Motions Tested? Yes  -KG    Superior glide WNL  -KG    Inferior glide WNL  -KG    Medial glide WNL  -KG    Lateral glide WNL  -KG    Left Knee    Extension/Flexion AROM Deficit 0-115 deg  -KG    MMT (Manual Muscle Testing)    General MMT Assessment Detail Continues to have slight quad lag with active SLR  -KG    Left Hip    Hip Flexion Gross Movement (4/5) good  -KG    Hip ABduction Gross Movement (4+/5) good plus  -KG    Hip ADduction Gross Movement (5/5) normal  -KG    Left Knee    Knee Extension Gross Movement (4+/5) good plus  -KG    Knee Flexion Gross Movement (4+/5) good plus  -KG      User Key  (r) = Recorded By, (t) = Taken By, (c) = Cosigned By    Initials Name Provider Type     Sky Naranjo, PTA Physical Therapy Assistant    KG Nini Oneil, PT Physical Therapist                            PT Assessment/Plan       06/02/17 1500       PT Assessment    Functional Limitations Impaired gait;Limitation in home management;Performance in leisure activities;Performance in work activities  -     Impairments Balance;Gait;Muscle strength;Pain;Range of motion  -     Assessment Comments Good alin of today's treatment with no increased pain. Continuing to progress towards  "remaining goals.   -JW     Rehab Potential Excellent  -JW     Patient/caregiver participated in establishment of treatment plan and goals Yes  -JW     Patient would benefit from skilled therapy intervention Yes  -JW     PT Plan    PT Frequency 2x/week  -JW     Predicted Duration of Therapy Intervention (days/wks) 2-3 weeks  -JW     PT Plan Comments Cont PT per POC.   -JW       User Key  (r) = Recorded By, (t) = Taken By, (c) = Cosigned By    Initials Name Provider Type    HUMBERTO Naranjo PTA Physical Therapy Assistant                    Exercises       06/02/17 1500          Subjective Comments    Subjective Comments Pt reports min knee pain today.   -JW      Subjective Pain    Able to rate subjective pain? yes  -JW      Pre-Treatment Pain Level 0  -JW      Post-Treatment Pain Level 0  -JW      Exercise 1    Exercise Name 1 PRO II- seat 7-4.0  -JW      Time (Minutes) 1 10'  -JW      Exercise 2    Exercise Name 2 Incline Calf S   -JW      Sets 2 2  -JW      Time (Seconds) 2 30\"  -JW      Exercise 3    Exercise Name 3 St Ham S  -JW      Sets 3 2  -JW      Time (Seconds) 3 30\"  -JW      Exercise 4    Exercise Name 4 SLR  -JW      Sets 4 1  -JW      Reps 4 20  -JW      Exercise 5    Exercise Name 5 Hip add squeeze  -JW      Sets 5 1  -JW      Reps 5 20  -JW      Exercise 6    Exercise Name 6 Bridges  -JW      Sets 6 2  -JW      Reps 6 10  -JW      Exercise 7    Exercise Name 7 LAQ  -JW      Equipment 7 Cuff Weight  -JW      Weights/Plates 7 3  -JW      Sets 7 1  -JW      Reps 7 20  -JW      Exercise 8    Exercise Name 8 Seated Hamstring Curls  -JW      Equipment 8 Theraband  -JW      Resistance 8 Red  -JW      Sets 8 2  -JW      Reps 8 10  -JW      Exercise 9    Exercise Name 9 CR  -JW      Sets 9 1  -JW      Reps 9 30  -JW      Exercise 10    Exercise Name 10 ST hip abd  -JW      Sets 10 1  -JW      Reps 10 20  -JW      Exercise 11    Exercise Name 11 Step-ups  -JW      Sets 11 2  -JW      Reps 11 10  -JW      " "Exercise 12    Exercise Name 12 Knee flex stretch  -      Sets 12 2  -JW      Time (Seconds) 12 30\"  -        User Key  (r) = Recorded By, (t) = Taken By, (c) = Cosigned By    Initials Name Provider Type    HUMBERTO Naranjo PTA Physical Therapy Assistant                               PT OP Goals       06/02/17 1500       PT Short Term Goals    STG Date to Achieve 05/18/17  -     STG 1 Indep in HEP  -JW     STG 1 Progress Met  -     STG 2 Demonstrate L hip flex/abd strength to 5/5  -JW     STG 2 Progress Not Met  -JW     STG 3 Demonstrate L knee ext AROM to 0 deg  -     STG 3 Progress Met  -JW     STG 4 Demonstrate L knee flex AROM to 120 deg or greater, no pain at end range  -     STG 4 Progress Not Met  -JW     STG 5 Demonstrate L  knee flex/ext MMT to 5/5  -JW     STG 5 Progress Not Met  -JW     STG 6 Ascend/descend 1 flight of stairs, step-to pattern with descent, no increased pain  -     STG 6 Progress New  -       User Key  (r) = Recorded By, (t) = Taken By, (c) = Cosigned By    Initials Name Provider Type    HUMBERTO Naranjo PTA Physical Therapy Assistant                    Outcome Measures       06/01/17 1500          Lower Extremity Functional Index    Any of your usual work, housework or school activities 4  -KG      Your usual hobbies, recreational or sporting activities 3  -KG      Getting into or out of the bath 4  -KG      Walking between rooms 4  -KG      Putting on your shoes or socks 3  -KG      Squatting 4  -KG      Lifting an object, like a bag of groceries from the floor 4  -KG      Performing light activities around your home 4  -KG      Performing heavy activities around your home 4  -KG      Getting into or out of a car 4  -KG      Walking 2 blocks 4  -KG      Walking a mile 4  -KG      Going up or down 10 stairs (about 1 flight of stairs) 4  -KG      Standing for 1 hour 4  -KG      Sitting for 1 hour 4  -KG      Running on even ground 4  -KG      Running on uneven ground " 4  -KG      Making sharp turns while running fast 4  -KG      Hopping 4  -KG      Rolling over in bed 4  -KG      Total 78  -KG      Functional Assessment    Outcome Measure Options Lower Extremity Functional Scale (LEFS)  -KG        User Key  (r) = Recorded By, (t) = Taken By, (c) = Cosigned By    Initials Name Provider Type    KG Nini Oneil, PT Physical Therapist            Time Calculation:   Start Time: 1458  Stop Time: 1548  Time Calculation (min): 50 min  Total Timed Code Minutes- PT: 50 minute(s)    Therapy Charges for Today     Code Description Service Date Service Provider Modifiers Qty    [id number]  PT THER PROC EA 15 MIN 6/2/2017 Sky Naranjo, PTA GP 3                    Sky Naranjo PTA  6/2/2017

## 2017-06-06 ENCOUNTER — HOSPITAL ENCOUNTER (OUTPATIENT)
Dept: PHYSICAL THERAPY | Facility: HOSPITAL | Age: 52
Setting detail: THERAPIES SERIES
Discharge: HOME | End: 2017-06-06

## 2017-06-06 DIAGNOSIS — M25.562 ACUTE PAIN OF LEFT KNEE: Primary | ICD-10-CM

## 2017-06-06 PROCEDURE — 97110 THERAPEUTIC EXERCISES: CPT | Performed by: PHYSICAL THERAPY ASSISTANT

## 2017-06-06 NOTE — THERAPY TREATMENT NOTE
Outpatient Physical Therapy Ortho Treatment Note  [facility]     Patient Name: Jefferson Pena  : 1965  MRN: [id number]  Today's Date: 2017      Visit Date: 2017    Visits    Recert Date 17   MD appointment 17   Pt reports  60% improvement       Visit Dx:    ICD-10-CM ICD-9-CM   1. Acute pain of left knee M25.562 719.46       Patient Active Problem List   Diagnosis   • Acute pain of left knee        Past Medical History:   Diagnosis Date   • Allergic rhinitis    • Asymptomatic varicose veins of bilateral lower extremities    • Cellulitis    • Cough    • Elevated blood pressure reading without diagnosis of hypertension    • Encounter for surgical aftercare following surgery on the circulatory system     LLE Stab Phlebectomies (24) 16      • H/O mammogram 2005    may represent a small cysy or a fibroadenoma,asymmetric density represent normal parenchyma   • Hordeolum externum right lower eyelid    • Obesity    • Other mucopurulent conjunctivitis, right eye    • Pain In Left Leg    • Peripheral venous insufficiency    • Upper respiratory infection    • Varicose veins of left lower leg with ulcer and inflammation    • Venous insufficiency     chronic) (peripheral) - Wears Compression stockings           Past Surgical History:   Procedure Laterality Date   • COLOSTOMY     • HYSTERECTOMY     • PAP SMEAR  2004    normal   • SKIN BIOPSY     • VASCULAR SURGERY  2016   • VEIN SURGERY  2016    Left lower extremity stab phlebectomies   • WRIST SURGERY  1982    excision, ganglion cyst,dorsum of the right              PT Ortho       17 1500    Subjective Pain    Post-Treatment Pain Level 3  -      User Key  (r) = Recorded By, (t) = Taken By, (c) = Cosigned By    Initials Name Provider Type    ALVARADO Flanagan PTA Physical Therapy Assistant                            PT Assessment/Plan       17 1500       PT Assessment    Assessment Comments  "pt had slight increase in pain with sit to stand but able to complete, no new goals met this date.  -     PT Plan    PT Frequency 2x/week  -     PT Plan Comments ROM next week  -       User Key  (r) = Recorded By, (t) = Taken By, (c) = Cosigned By    Initials Name Provider Type     Timothy Flanagan PTA Physical Therapy Assistant                    Exercises       06/06/17 1500          Subjective Comments    Subjective Comments Pt states she has been on her feet all day today.  -      Subjective Pain    Able to rate subjective pain? yes  -      Pre-Treatment Pain Level 1  -      Post-Treatment Pain Level 3  -      Exercise 1    Exercise Name 1 PRO II- seat 5-4.0  -      Time (Minutes) 1 10'  -      Exercise 2    Exercise Name 2 Incline Calf S   -      Sets 2 2  -      Time (Seconds) 2 30\"  -      Exercise 3    Exercise Name 3 St Ham S  -      Sets 3 2  -      Time (Seconds) 3 30\"  -      Exercise 4    Exercise Name 4 step up fwd,lat 6 in  -      Sets 4 1  -JH      Reps 4 20  -JH      Exercise 5    Exercise Name 5 CR on airex  -      Reps 5 20  -JH      Exercise 6    Exercise Name 6 MS on air ex  -      Reps 6 20  -JH      Exercise 7    Exercise Name 7 LAQ  -      Equipment 7 Cuff Weight  -      Weights/Plates 7 3  -JH      Sets 7 2  -JH      Reps 7 15  -JH      Exercise 8    Exercise Name 8 seated hip flexion   -      Equipment 8 Cuff Weight  -JH      Weights/Plates 8 3  -JH      Sets 8 2  -JH      Reps 8 15  -JH      Exercise 9    Exercise Name 9 sit to stand  -      Sets 9 2  -JH      Reps 9 10  -JH      Exercise 10    Exercise Name 10 seated ham curl  -      Equipment 10 Theraband  -      Resistance 10 Red  -JH      Sets 10 2  -JH      Reps 10 10  -JH      Exercise 11    Exercise Name 11 supine SLR  -JH      Sets 11 2  -      Reps 11 15  -JH      Exercise 12    Exercise Name 12 supine SLR vmo  -      Sets 12 2  -JH      Reps 12 10  -JH      Exercise 13    " Exercise Name 13 HS with [facility]p  -      Sets 13 2  -      Reps 13 15  -JH        User Key  (r) = Recorded By, (t) = Taken By, (c) = Cosigned By    Initials Name Provider Type     Timothy Flanagan PTA Physical Therapy Assistant                                       Time Calculation:   Start Time: 1500  Stop Time: 1555  Time Calculation (min): 55 min                  Timothy Flanagan PTA  6/6/2017

## 2017-06-08 ENCOUNTER — HOSPITAL ENCOUNTER (OUTPATIENT)
Dept: PHYSICAL THERAPY | Facility: HOSPITAL | Age: 52
Setting detail: THERAPIES SERIES
Discharge: HOME | End: 2017-06-08

## 2017-06-08 DIAGNOSIS — M25.562 ACUTE PAIN OF LEFT KNEE: Primary | ICD-10-CM

## 2017-06-08 PROCEDURE — 97110 THERAPEUTIC EXERCISES: CPT

## 2017-06-08 NOTE — THERAPY TREATMENT NOTE
Outpatient Physical Therapy Ortho Treatment Note  [facility]     Patient Name: Jefferson Pena  : 1965  MRN: [id number]  Today's Date: 2017      Visit Date: 2017  Visits 10/10. Judith 17. MD f/dilcia 17. 60% improvement.   Visit Dx:    ICD-10-CM ICD-9-CM   1. Acute pain of left knee M25.562 719.46       Patient Active Problem List   Diagnosis   • Acute pain of left knee        Past Medical History:   Diagnosis Date   • Allergic rhinitis    • Asymptomatic varicose veins of bilateral lower extremities    • Cellulitis    • Cough    • Elevated blood pressure reading without diagnosis of hypertension    • Encounter for surgical aftercare following surgery on the circulatory system     LLE Stab Phlebectomies (24) 16      • H/O mammogram 2005    may represent a small cysy or a fibroadenoma,asymmetric density represent normal parenchyma   • Hordeolum externum right lower eyelid    • Obesity    • Other mucopurulent conjunctivitis, right eye    • Pain In Left Leg    • Peripheral venous insufficiency    • Upper respiratory infection    • Varicose veins of left lower leg with ulcer and inflammation    • Venous insufficiency     chronic) (peripheral) - Wears Compression stockings           Past Surgical History:   Procedure Laterality Date   • COLOSTOMY     • HYSTERECTOMY     • PAP SMEAR  2004    normal   • SKIN BIOPSY     • VASCULAR SURGERY  2016   • VEIN SURGERY  2016    Left lower extremity stab phlebectomies   • WRIST SURGERY  1982    excision, ganglion cyst,dorsum of the right              PT Ortho       17 1500    Posture/Observations    Posture/Observations Comments Mild AG.   -JW      17 1500    Subjective Pain    Post-Treatment Pain Level 3  -      User Key  (r) = Recorded By, (t) = Taken By, (c) = Cosigned By    Initials Name Provider Type     Sky Naranjo PTA Physical Therapy Assistant    ALVARADO Flanagan PTA Physical Therapy  Assistant                            PT Assessment/Plan       06/08/17 1400       PT Assessment    Functional Limitations Impaired gait;Limitation in home management;Performance in leisure activities;Performance in work activities  -     Impairments Balance;Gait;Muscle strength;Pain;Range of motion  -JW     Assessment Comments Good alin and effort with all TE. Some continued reactivity with WB activity.   -JW     Rehab Potential Excellent  -JW     Patient/caregiver participated in establishment of treatment plan and goals Yes  -JW     Patient would benefit from skilled therapy intervention Yes  -JW     PT Plan    PT Frequency 2x/week  -JW     Predicted Duration of Therapy Intervention (days/wks) 2-3 weeks  -JW     PT Plan Comments Cont PT per POC.   -JW       User Key  (r) = Recorded By, (t) = Taken By, (c) = Cosigned By    Initials Name Provider Type    HUMBERTO Naranjo PTA Physical Therapy Assistant                    Exercises       06/08/17 1500          Subjective Comments    Subjective Comments No new reports/complaints. Min knee discomfort reported.   -JW      Subjective Pain    Able to rate subjective pain? yes  -JW      Pre-Treatment Pain Level 1  -JW      Post-Treatment Pain Level 1  -JW      Exercise 1    Exercise Name 1 PRO II- seat 5-4.0  -JW      Time (Minutes) 1 10'  -JW      Exercise 2    Exercise Name 2 Incline Calf S   -JW      Time (Minutes) 2 1'  -JW      Exercise 3    Exercise Name 3 St Ham S  -JW      Time (Minutes) 3 1'  -JW      Exercise 4    Exercise Name 4 SLR  -JW      Sets 4 2  -JW      Reps 4 20  -JW      Exercise 5    Exercise Name 5 Bridge  -JW      Sets 5 2  -JW      Reps 5 15  -JW      Exercise 6    Exercise Name 6 SAQ with hold  -JW      Equipment 6 Cuff Weight  -JW      Weights/Plates 6 3  -JW      Reps 6 20  -JW      Exercise 7    Exercise Name 7 HS curl  -JW      Equipment 7 Theraband  -JW      Resistance 7 Green  -JW      Reps 7 25  -JW      Exercise 8    Exercise Name 8  Step ups  -JW      Reps 8 20  -JW      Exercise 9    Exercise Name 9 CR  -JW      Reps 9 30  -JW      Exercise 10    Exercise Name 10 Hip abd  -JW      Equipment 10 Cuff Weight  -JW      Weights/Plates 10 2  -JW      Reps 10 20  -JW      Exercise 11    Exercise Name 11 ST HS curls  -JW      Equipment 11 Cuff Weight  -JW      Weights/Plates 11 2  -JW      Reps 11 20  -JW      Exercise 12    Exercise Name 12 MS  -JW      Sets 12 2  -JW      Reps 12 10  -JW        User Key  (r) = Recorded By, (t) = Taken By, (c) = Cosigned By    Initials Name Provider Type     Sky Naranjo PTA Physical Therapy Assistant                               PT OP Goals       06/08/17 1400       PT Short Term Goals    STG Date to Achieve 05/18/17  -JW     STG 1 Indep in HEP  -JW     STG 1 Progress Met  -JW     STG 2 Demonstrate L hip flex/abd strength to 5/5  -JW     STG 2 Progress Not Met  -JW     STG 3 Demonstrate L knee ext AROM to 0 deg  -JW     STG 3 Progress Met  -JW     STG 4 Demonstrate L knee flex AROM to 120 deg or greater, no pain at end range  -JW     STG 4 Progress Not Met  -JW     STG 5 Demonstrate L  knee flex/ext MMT to 5/5  -JW     STG 5 Progress Not Met  -JW     STG 6 Ascend/descend 1 flight of stairs, step-to pattern with descent, no increased pain  -JW     STG 6 Progress New  -JW       User Key  (r) = Recorded By, (t) = Taken By, (c) = Cosigned By    Initials Name Provider Type     Sky Naranjo PTA Physical Therapy Assistant                    Time Calculation:   Start Time: 1500  Stop Time: 1548  Time Calculation (min): 48 min  Total Timed Code Minutes- PT: 48 minute(s)    Therapy Charges for Today     Code Description Service Date Service Provider Modifiers Qty    91816061104  PT THER PROC EA 15 MIN 6/8/2017 Sky Naranjo PTA GP 3                    Sky Naranjo PTA  6/8/2017

## 2017-06-13 ENCOUNTER — HOSPITAL ENCOUNTER (OUTPATIENT)
Facility: HOSPITAL | Age: 52
Setting detail: HOSPITAL OUTPATIENT SURGERY
End: 2017-06-13
Attending: INTERNAL MEDICINE | Admitting: INTERNAL MEDICINE

## 2017-06-13 ENCOUNTER — HOSPITAL ENCOUNTER (OUTPATIENT)
Dept: PHYSICAL THERAPY | Facility: HOSPITAL | Age: 52
Setting detail: THERAPIES SERIES
Discharge: HOME | End: 2017-06-13

## 2017-06-13 DIAGNOSIS — M25.562 ACUTE PAIN OF LEFT KNEE: Primary | ICD-10-CM

## 2017-06-13 PROCEDURE — 97110 THERAPEUTIC EXERCISES: CPT

## 2017-06-13 NOTE — THERAPY TREATMENT NOTE
Outpatient Physical Therapy Ortho Treatment Note  [facility]     Patient Name: Jefferson Pena  : 1965  MRN: [id number]  Today's Date: 2017      Visit Date: 2017   Attendance:  Subjective improvement:60%  Recert:17  MD Appointment: 17 15:00      Visit Dx:    ICD-10-CM ICD-9-CM   1. Acute pain of left knee M25.562 719.46       Patient Active Problem List   Diagnosis   • Acute pain of left knee        Past Medical History:   Diagnosis Date   • Allergic rhinitis    • Asymptomatic varicose veins of bilateral lower extremities    • Cellulitis    • Cough    • Elevated blood pressure reading without diagnosis of hypertension    • Encounter for surgical aftercare following surgery on the circulatory system     LLE Stab Phlebectomies (24) 16      • H/O mammogram 2005    may represent a small cysy or a fibroadenoma,asymmetric density represent normal parenchyma   • Hordeolum externum right lower eyelid    • Obesity    • Other mucopurulent conjunctivitis, right eye    • Pain In Left Leg    • Peripheral venous insufficiency    • Upper respiratory infection    • Varicose veins of left lower leg with ulcer and inflammation    • Venous insufficiency     chronic) (peripheral) - Wears Compression stockings           Past Surgical History:   Procedure Laterality Date   • COLOSTOMY     • HYSTERECTOMY     • PAP SMEAR  2004    normal   • SKIN BIOPSY     • VASCULAR SURGERY  2016   • VEIN SURGERY  2016    Left lower extremity stab phlebectomies   • WRIST SURGERY  1982    excision, ganglion cyst,dorsum of the right                              PT Assessment/Plan       17 1500       PT Assessment    Functional Limitations Impaired gait;Limitation in home management;Performance in leisure activities;Performance in work activities  -EM     Impairments Balance;Gait;Muscle strength;Pain;Range of motion  -EM     Rehab Potential Excellent  -EM      "Patient/caregiver participated in establishment of treatment plan and goals Yes  -EM     Patient would benefit from skilled therapy intervention Yes  -EM     PT Plan    PT Frequency 2x/week  -EM     Predicted Duration of Therapy Intervention (days/wks) 1 visit  -EM     PT Plan Comments D/C to Ind HEP next tx with 1 mon FF membership. Issue HEP.  -EM       User Key  (r) = Recorded By, (t) = Taken By, (c) = Cosigned By    Initials Name Provider Type    EM Frederick Rivera, PTA Physical Therapy Assistant                    Exercises       06/13/17 1500          Subjective Comments    Subjective Comments \"Im still real cautious when going down stairs, I still have aches and pains sometimes\" Pt reports yesterday being more painful.  -EM      Subjective Pain    Able to rate subjective pain? yes  -EM      Pre-Treatment Pain Level 2  -EM      Post-Treatment Pain Level 2  -EM      Exercise 1    Exercise Name 1 PRO II- seat 5-4.0  -EM      Time (Minutes) 1 10'  -EM      Exercise 2    Exercise Name 2 Incline Calf S   -EM      Sets 2 3  -EM      Time (Seconds) 2 30\"  -EM      Exercise 3    Exercise Name 3 St Ham S  -EM      Sets 3 3  -EM      Time (Seconds) 3 30\"  -EM      Exercise 4    Exercise Name 4 Fwd/ Lat Step Up-6\"  -EM      Sets 4 1  -EM      Reps 4 30  -EM      Exercise 5    Exercise Name 5 Eccentric Step Down-6\"  -EM      Sets 5 1  -EM      Reps 5 30  -EM      Exercise 6    Exercise Name 6 MS  -EM      Sets 6 3  -EM      Reps 6 10  -EM      Exercise 7    Exercise Name 7 St CR  -EM      Sets 7 1  -EM      Reps 7 30  -EM      Exercise 8    Exercise Name 8 LAQ w/ AW  -EM      Equipment 8 Cuff Weight  -EM      Weights/Plates 8 3  -EM      Sets 8 1  -EM      Reps 8 30  -EM      Exercise 9    Exercise Name 9 Hip Flexion w/ AW  -EM      Equipment 9 Cuff Weight  -EM      Weights/Plates 9 3  -EM      Sets 9 1  -EM      Reps 9 30  -EM      Exercise 10    Exercise Name 10 Sup SLR w/ AW  -EM      Equipment 10 Cuff Weight  -EM      " Weights/Plates 10 3  -EM      Sets 10 1  -EM      Reps 10 30  -EM      Exercise 11    Exercise Name 11 Sup VMO SLR  -EM      Sets 11 1  -EM      Reps 11 30  -EM        User Key  (r) = Recorded By, (t) = Taken By, (c) = Cosigned By    Initials Name Provider Type     Frederick Rivera PTA Physical Therapy Assistant                               PT OP Goals       06/13/17 1500       PT Short Term Goals    STG Date to Achieve 05/18/17  -EM     STG 1 Indep in HEP  -EM     STG 1 Progress Met  -EM     STG 2 Demonstrate L hip flex/abd strength to 5/5  -EM     STG 2 Progress Not Met  -EM     STG 3 Demonstrate L knee ext AROM to 0 deg  -EM     STG 3 Progress Met  -EM     STG 4 Demonstrate L knee flex AROM to 120 deg or greater, no pain at end range  -EM     STG 4 Progress Not Met  -EM     STG 5 Demonstrate L  knee flex/ext MMT to 5/5  -EM     STG 5 Progress Not Met  -EM     STG 6 Ascend/descend 1 flight of stairs, step-to pattern with descent, no increased pain  -EM     STG 6 Progress New  -EM     Time Calculation    PT Goal Re-Cert Due Date 06/22/17  -EM       User Key  (r) = Recorded By, (t) = Taken By, (c) = Cosigned By    Initials Name Provider Type     Frederick Rivera PTA Physical Therapy Assistant                    Time Calculation:   Start Time: 1506  Stop Time: 1556  Time Calculation (min): 50 min  Total Timed Code Minutes- PT: 50 minute(s)    Therapy Charges for Today     Code Description Service Date Service Provider Modifiers Qty    69038914216 HC PT THER PROC EA 15 MIN 6/13/2017 Frederick Rivera PTA GP 4                    Frederick Rivera PTA  6/13/2017

## 2017-06-14 ENCOUNTER — HOSPITAL ENCOUNTER (OUTPATIENT)
Dept: PHYSICAL THERAPY | Facility: HOSPITAL | Age: 52
Setting detail: THERAPIES SERIES
Discharge: HOME | End: 2017-06-14

## 2017-06-14 DIAGNOSIS — M25.562 ACUTE PAIN OF LEFT KNEE: Primary | ICD-10-CM

## 2017-06-14 PROCEDURE — 97116 GAIT TRAINING THERAPY: CPT

## 2017-06-14 PROCEDURE — 97110 THERAPEUTIC EXERCISES: CPT

## 2017-06-14 NOTE — THERAPY DISCHARGE NOTE
Outpatient Physical Therapy Ortho Treatment Note/Discharge Summary  [facility]     Patient Name: Jefferson Pena  : 1965  MRN: [id number]  Today's Date: 2017      Visit Date: 2017  Attendance:  Subjective improvement: 60%  Recert: 17  MD Appointment: 17 15:00    Visit Dx:    ICD-10-CM ICD-9-CM   1. Acute pain of left knee M25.562 719.46       Patient Active Problem List   Diagnosis   • Acute pain of left knee        Past Medical History:   Diagnosis Date   • Allergic rhinitis    • Asymptomatic varicose veins of bilateral lower extremities    • Cellulitis    • Cough    • Elevated blood pressure reading without diagnosis of hypertension    • Encounter for surgical aftercare following surgery on the circulatory system     LLE Stab Phlebectomies (24) 16      • H/O mammogram 2005    may represent a small cysy or a fibroadenoma,asymmetric density represent normal parenchyma   • Hordeolum externum right lower eyelid    • Obesity    • Other mucopurulent conjunctivitis, right eye    • Pain In Left Leg    • Peripheral venous insufficiency    • Upper respiratory infection    • Varicose veins of left lower leg with ulcer and inflammation    • Venous insufficiency     chronic) (peripheral) - Wears Compression stockings           Past Surgical History:   Procedure Laterality Date   • COLOSTOMY     • HYSTERECTOMY     • PAP SMEAR  2004    normal   • SKIN BIOPSY     • VASCULAR SURGERY  2016   • VEIN SURGERY  2016    Left lower extremity stab phlebectomies   • WRIST SURGERY  1982    excision, ganglion cyst,dorsum of the right              PT Ortho       17 1500    Precautions and Contraindications    Precautions/Limitations no known precautions/limitations  -EM    Posture/Observations    Posture/Observations Comments Amb with slight antalgic gt  -EM    Left Knee    Extension/Flexion AROM Deficit 0-121 supine AROM  -EM    Left Hip    Hip Flexion  Gross Movement (4+/5) good plus  -EM    Hip ABduction Gross Movement (4+/5) good plus  -EM    Left Knee    Knee Extension Gross Movement (5/5) normal  -EM    Knee Flexion Gross Movement (5/5) normal  -EM    Stairs Assessment/Treatment    Number of Stairs 1 flight x2   -EM    Stairs, Handrail Location right side (ascending)  -EM    Stairs, [place] Level independent  -EM    Stairs, Technique Used step over step (ascending);step over step (descending)  -EM    Stairs, Comment 3/10 pain noted when descending stairs recripically  -EM      User Key  (r) = Recorded By, (t) = Taken By, (c) = Cosigned By    Initials Name Provider Type    LILY Rivera PTA Physical Therapy Assistant                            PT Assessment/Plan       06/14/17 1500       PT Assessment    Functional Limitations Impaired gait;Limitation in home management;Performance in leisure activities;Performance in work activities  -EM     Impairments Balance;Gait;Muscle strength;Pain;Range of motion  -EM     Assessment Comments Pt alin tx well, verbalizes understanding of HEP.  -EM     Rehab Potential Excellent  -EM     Patient/caregiver participated in establishment of treatment plan and goals Yes  -EM     Patient would benefit from skilled therapy intervention Yes  -EM     PT Plan    PT Frequency 2x/week  -EM     PT Plan Comments Plan D/C to Ind HEP and 1 month FF membership. Hold chart until July 1  to return to PT incase any issues arise.  -EM       User Key  (r) = Recorded By, (t) = Taken By, (c) = Cosigned By    Initials Name Provider Type    ILLY Rivera PTA Physical Therapy Assistant                Modalities       06/14/17 1500          Subjective Pain    Post-Treatment Pain Level 2  -EM        User Key  (r) = Recorded By, (t) = Taken By, (c) = Cosigned By    Initials Name Provider Type    LILY Rivera PTA Physical Therapy Assistant                Exercises       06/14/17 1500          Subjective Comments    Subjective Comments  "\"Its still a little sore along patella.\"  -EM      Subjective Pain    Able to rate subjective pain? yes  -EM      Pre-Treatment Pain Level 2  -EM      Post-Treatment Pain Level 2  -EM      Exercise 1    Exercise Name 1 PRO II- seat 5-4.0  -EM      Time (Minutes) 1 10'  -EM      Exercise 2    Exercise Name 2 Incline Calf S   -EM      Sets 2 3  -EM      Time (Seconds) 2 30\"  -EM      Exercise 3    Exercise Name 3 St Ham S  -EM      Sets 3 3  -EM      Time (Seconds) 3 30\"  -EM      Exercise 4    Exercise Name 4 Fwd/ Lat Step Up-6\"  -EM      Sets 4 1  -EM      Reps 4 30  -EM      Exercise 5    Exercise Name 5 Eccentric Step Down-6\"  -EM      Sets 5 1  -EM      Reps 5 30  -EM      Exercise 6    Exercise Name 6 MS  -EM      Sets 6 1  -EM      Reps 6 30  -EM      Exercise 7    Exercise Name 7 St CR  -EM      Sets 7 1  -EM      Reps 7 30  -EM      Exercise 8    Exercise Name 8 stair training  -EM      Time (Minutes) 8 8  -EM        User Key  (r) = Recorded By, (t) = Taken By, (c) = Cosigned By    Initials Name Provider Type    EM Frederick Rivera, PTA Physical Therapy Assistant                               PT OP Goals       06/14/17 1500       PT Short Term Goals    STG Date to Achieve 05/18/17  -EM     STG 1 Indep in HEP  -EM     STG 1 Progress Met  -EM     STG 2 Demonstrate L hip flex/abd strength to 5/5  -EM     STG 2 Progress Not Met  -EM     STG 3 Demonstrate L knee ext AROM to 0 deg  -EM     STG 3 Progress Met  -EM     STG 4 Demonstrate L knee flex AROM to 120 deg or greater, no pain at end range  -EM     STG 4 Progress Met  -EM     STG 5 Demonstrate L  knee flex/ext MMT to 5/5  -EM     STG 5 Progress Met  -EM     STG 6 Ascend/descend 1 flight of stairs, step-to pattern with descent, no increased pain  -EM     STG 6 Progress Partially Met   3/10pain noted when descending steps  -EM     Time Calculation    PT Goal Re-Cert Due Date 06/22/17  -EM       User Key  (r) = Recorded By, (t) = Taken By, (c) = Cosigned By    " Initials Name Provider Type    EM Frederick Rivera PTA Physical Therapy Assistant                Therapy Education       06/14/17 1610          Therapy Education    Given HEP   Finalized HEP issued: st Ham S, st 3 way SLR, MS, LAQ, Hip flex, St marches, VMO SLR, SLR, QS.  -EM      Program New  -EM      How Provided Verbal;Written  -EM      Provided to Patient  -EM      Level of Understanding Verbalized;Demonstrated  -EM        User Key  (r) = Recorded By, (t) = Taken By, (c) = Cosigned By    Initials Name Provider Type    EM Frederick Rivera PTA Physical Therapy Assistant                Outcome Measures       06/14/17 1600          Lower Extremity Functional Index    Any of your usual work, housework or school activities 4  -EM      Your usual hobbies, recreational or sporting activities 4  -EM      Getting into or out of the bath 4  -EM      Walking between rooms 4  -EM      Putting on your shoes or socks 4  -EM      Squatting 4  -EM      Lifting an object, like a bag of groceries from the floor 4  -EM      Performing light activities around your home 4  -EM      Performing heavy activities around your home 4  -EM      Getting into or out of a car 4  -EM      Walking 2 blocks 4  -EM      Walking a mile 4  -EM      Going up or down 10 stairs (about 1 flight of stairs) 3  -EM      Standing for 1 hour 4  -EM      Sitting for 1 hour 2  -EM      Running on even ground 2  -EM      Running on uneven ground 2  -EM      Making sharp turns while running fast 2  -EM      Hopping 2  -EM      Rolling over in bed 4  -EM      Total 69  -EM      Functional Assessment    Outcome Measure Options Lower Extremity Functional Scale (LEFS)  -EM        User Key  (r) = Recorded By, (t) = Taken By, (c) = Cosigned By    Initials Name Provider Type    LILY Rivera PTA Physical Therapy Assistant            Time Calculation:   Start Time: 1445  Stop Time: 1615  Time Calculation (min): 90 min  Total Timed Code Minutes- PT: 90  minute(s)    Therapy Charges for Today     Code Description Service Date Service Provider Modifiers Qty    13596082930  PT THER PROC EA 15 MIN 6/14/2017 Frederick Rivera, PTA GP 4    [id number]  GAIT TRAINING EA 15 MIN 6/14/2017 Frederick Rivera, PTA GP 1          PT G-Codes  Outcome Measure Options: Lower Extremity Functional Scale (LEFS)            Frederick Rivera, PTA  6/14/2017

## 2017-08-17 ENCOUNTER — OFFICE VISIT (OUTPATIENT)
Dept: FAMILY MEDICINE CLINIC | Facility: CLINIC | Age: 52
End: 2017-08-17

## 2017-08-17 VITALS
BODY MASS INDEX: 34.16 KG/M2 | HEART RATE: 60 BPM | DIASTOLIC BLOOD PRESSURE: 80 MMHG | OXYGEN SATURATION: 98 % | SYSTOLIC BLOOD PRESSURE: 122 MMHG | HEIGHT: 65 IN | WEIGHT: 205 LBS

## 2017-08-17 DIAGNOSIS — G89.29 CHRONIC ARTHRALGIAS OF KNEES AND HIPS: ICD-10-CM

## 2017-08-17 DIAGNOSIS — M25.551 CHRONIC ARTHRALGIAS OF KNEES AND HIPS: ICD-10-CM

## 2017-08-17 DIAGNOSIS — M25.562 CHRONIC ARTHRALGIAS OF KNEES AND HIPS: ICD-10-CM

## 2017-08-17 DIAGNOSIS — M25.552 CHRONIC ARTHRALGIAS OF KNEES AND HIPS: ICD-10-CM

## 2017-08-17 DIAGNOSIS — M25.561 CHRONIC ARTHRALGIAS OF KNEES AND HIPS: ICD-10-CM

## 2017-08-17 DIAGNOSIS — M79.605 LEFT LEG PAIN: Primary | ICD-10-CM

## 2017-08-17 DIAGNOSIS — Z13.9 SCREENING: ICD-10-CM

## 2017-08-17 PROCEDURE — 90715 TDAP VACCINE 7 YRS/> IM: CPT | Performed by: FAMILY MEDICINE

## 2017-08-17 PROCEDURE — 90471 IMMUNIZATION ADMIN: CPT | Performed by: FAMILY MEDICINE

## 2017-08-17 PROCEDURE — 99214 OFFICE O/P EST MOD 30 MIN: CPT | Performed by: FAMILY MEDICINE

## 2017-08-17 NOTE — PROGRESS NOTES
Subjective   Jefferson Pena is a 52 y.o. female.     History of Present Illness The patient comes in for check of her chronic medical illnesses.  She is at her baseline.    The following portions of the patient's history were reviewed and updated as appropriate: allergies, current medications, past family history, past medical history, past social history, past surgical history and problem list.    Review of Systems   Constitutional: Negative for fatigue and fever.   Respiratory: Negative for cough, chest tightness and stridor.    Cardiovascular: Negative for chest pain, palpitations and leg swelling.       Objective   Physical Exam   Constitutional: She appears well-developed and well-nourished.   HENT:   Head: Normocephalic and atraumatic.   Right Ear: External ear normal.   Left Ear: External ear normal.   Nose: Nose normal.   Mouth/Throat: Oropharynx is clear and moist.   Eyes: Pupils are equal, round, and reactive to light.   Neck: Normal range of motion.   Cardiovascular: Normal rate, regular rhythm and normal heart sounds.  Exam reveals no gallop and no friction rub.    No murmur heard.  Pulmonary/Chest: Effort normal and breath sounds normal. No respiratory distress. She has no wheezes. She has no rales.   Abdominal: Soft. Bowel sounds are normal. She exhibits no distension. There is no tenderness.   Musculoskeletal: She exhibits no edema.   Skin: Skin is warm and dry.   Vitals reviewed.      Assessment/Plan   Jefferson was seen today for allergic rhinitis.    Diagnoses and all orders for this visit:    Left leg pain    Chronic arthralgias of knees and hips    Screening  -     CBC & Differential  -     Comprehensive Metabolic Panel  -     Hepatitis C antibody; Future  -     Mammo Screening Bilateral With CAD; Future    Other orders  -     Tdap Vaccine Greater Than or Equal To 8yo IM        Will contact with labs and X-ray results.  Return in 6 months.

## 2017-09-19 ENCOUNTER — DOCUMENTATION (OUTPATIENT)
Dept: PHYSICAL THERAPY | Facility: HOSPITAL | Age: 52
End: 2017-09-19

## 2017-09-19 DIAGNOSIS — M25.562 ACUTE PAIN OF LEFT KNEE: Primary | ICD-10-CM

## 2017-09-19 NOTE — THERAPY DISCHARGE NOTE
Outpatient Physical Therapy Discharge Summary         Patient Name: Jefferson Pena  : 1965  MRN: [id number]    Today's Date: 2017    Visit Dx:    ICD-10-CM ICD-9-CM   1. Acute pain of left knee M25.562 719.46             PT OP Goals       17 0800       PT Short Term Goals    STG Date to Achieve 17  -     STG 1 Indep in HEP  -     STG 1 Progress Met  -     STG 2 Demonstrate L hip flex/abd strength to   -     STG 2 Progress Not Met  -     STG 3 Demonstrate L knee ext AROM to 0 deg  -     STG 3 Progress Met  -     STG 4 Demonstrate L knee flex AROM to 120 deg or greater, no pain at end range  -     STG 4 Progress Met  -     STG 5 Demonstrate L  knee flex/ext MMT to   -     STG 5 Progress Met  -     STG 6 Ascend/descend 1 flight of stairs, step-to pattern with descent, no increased pain  -     STG 6 Progress Partially Met   3/10pain noted when descending steps  -     Time Calculation    PT Goal Re-Cert Due Date 17  -       User Key  (r) = Recorded By, (t) = Taken By, (c) = Cosigned By    Initials Name Provider Type     Rose Brown PTA Physical Therapy Assistant          OP PT Discharge Summary  Date of Discharge: 17  Reason for Discharge: Maximum functional potential achieved  Outcomes Achieved: Patient able to partially acheive established goals  Discharge Destination: Home with home program      Time Calculation:                    Rose Brown PTA  2017

## 2017-11-15 ENCOUNTER — OFFICE VISIT (OUTPATIENT)
Dept: FAMILY MEDICINE CLINIC | Facility: CLINIC | Age: 52
End: 2017-11-15

## 2017-11-15 VITALS
OXYGEN SATURATION: 97 % | WEIGHT: 202 LBS | SYSTOLIC BLOOD PRESSURE: 122 MMHG | DIASTOLIC BLOOD PRESSURE: 88 MMHG | HEART RATE: 68 BPM | BODY MASS INDEX: 33.66 KG/M2 | HEIGHT: 65 IN

## 2017-11-15 DIAGNOSIS — R05.9 COUGH: ICD-10-CM

## 2017-11-15 DIAGNOSIS — J40 BRONCHITIS: Primary | ICD-10-CM

## 2017-11-15 PROCEDURE — 99213 OFFICE O/P EST LOW 20 MIN: CPT | Performed by: FAMILY MEDICINE

## 2017-11-15 RX ORDER — ALBUTEROL SULFATE 90 UG/1
2 AEROSOL, METERED RESPIRATORY (INHALATION) EVERY 4 HOURS PRN
Qty: 18 G | Refills: 1 | Status: SHIPPED | OUTPATIENT
Start: 2017-11-15 | End: 2019-02-06 | Stop reason: SDUPTHER

## 2017-11-15 RX ORDER — AZITHROMYCIN 250 MG/1
TABLET, FILM COATED ORAL
Qty: 6 TABLET | Refills: 0 | Status: SHIPPED | OUTPATIENT
Start: 2017-11-15 | End: 2017-12-01

## 2017-11-15 NOTE — PROGRESS NOTES
Subjective   Jefferson Pena is a 52 y.o. female.     History of Present Illness   She comes in for complaint of cough and congestion.  This is been going on for approximately 2 weeks.  O2 sat is 97% on room air  The following portions of the patient's history were reviewed and updated as appropriate: allergies, current medications, past family history, past medical history, past social history, past surgical history and problem list.    Review of Systems   Constitutional: Negative for fatigue and fever.   Respiratory: Positive for cough. Negative for chest tightness, wheezing and stridor.    Cardiovascular: Negative for chest pain, palpitations and leg swelling.       Objective   Physical Exam   Constitutional: She appears well-developed and well-nourished.   HENT:   Head: Normocephalic and atraumatic.   Right Ear: External ear normal.   Left Ear: External ear normal.   Nose: Nose normal.   Mouth/Throat: Oropharynx is clear and moist.   Eyes: EOM are normal. Pupils are equal, round, and reactive to light.   Neck: Normal range of motion.   Cardiovascular: Normal rate, regular rhythm and normal heart sounds.  Exam reveals no gallop and no friction rub.    No murmur heard.  Pulmonary/Chest: She has no wheezes. She has no rales.   Rhonchi present.   Abdominal: Bowel sounds are normal.   Skin: Skin is dry.   Vitals reviewed.      Assessment/Plan   Jefferson was seen today for uri and cough.    Diagnoses and all orders for this visit:    Bronchitis    Cough    Other orders  -     azithromycin (ZITHROMAX) 250 MG tablet; Take 2 tablets by mouth the first day, then 1 tablet daily for the next 4 days.  -     albuterol (PROVENTIL HFA;VENTOLIN HFA) 108 (90 Base) MCG/ACT inhaler; Inhale 2 puffs Every 4 (Four) Hours As Needed for Wheezing.        Return in 2 weeks.

## 2017-12-01 ENCOUNTER — OFFICE VISIT (OUTPATIENT)
Dept: FAMILY MEDICINE CLINIC | Facility: CLINIC | Age: 52
End: 2017-12-01

## 2017-12-01 VITALS
HEIGHT: 65 IN | DIASTOLIC BLOOD PRESSURE: 88 MMHG | OXYGEN SATURATION: 98 % | BODY MASS INDEX: 34.16 KG/M2 | SYSTOLIC BLOOD PRESSURE: 130 MMHG | HEART RATE: 76 BPM | WEIGHT: 205 LBS

## 2017-12-01 DIAGNOSIS — R05.9 COUGH: Primary | ICD-10-CM

## 2017-12-01 DIAGNOSIS — J40 BRONCHITIS: ICD-10-CM

## 2017-12-01 PROCEDURE — 99213 OFFICE O/P EST LOW 20 MIN: CPT | Performed by: FAMILY MEDICINE

## 2018-07-06 ENCOUNTER — OFFICE VISIT (OUTPATIENT)
Dept: FAMILY MEDICINE CLINIC | Facility: CLINIC | Age: 53
End: 2018-07-06

## 2018-07-06 VITALS
HEART RATE: 55 BPM | BODY MASS INDEX: 35.17 KG/M2 | OXYGEN SATURATION: 96 % | SYSTOLIC BLOOD PRESSURE: 156 MMHG | WEIGHT: 206 LBS | DIASTOLIC BLOOD PRESSURE: 90 MMHG | HEIGHT: 64 IN

## 2018-07-06 DIAGNOSIS — J30.89 CHRONIC ALLERGIC RHINITIS DUE TO OTHER ALLERGIC TRIGGER, UNSPECIFIED SEASONALITY: ICD-10-CM

## 2018-07-06 DIAGNOSIS — I83.892 VARICOSE VEINS OF LEFT LEG WITH EDEMA: ICD-10-CM

## 2018-07-06 DIAGNOSIS — R60.0 EDEMA OF LEFT FOOT: Primary | ICD-10-CM

## 2018-07-06 PROCEDURE — 99214 OFFICE O/P EST MOD 30 MIN: CPT | Performed by: FAMILY MEDICINE

## 2018-07-06 RX ORDER — DESLORATADINE 5 MG/1
5 TABLET ORAL DAILY
Qty: 15 TABLET | Refills: 1 | Status: SHIPPED | OUTPATIENT
Start: 2018-07-06 | End: 2019-12-09

## 2018-07-06 NOTE — PROGRESS NOTES
2/16/2018      Morelia Simental MD  [address] [facility] [address]  [facility] [address]      RE: Valencia Ye       Dear Colleague,    I had the pleasure of seeing Valencia Ye in the [facility].    Service Date: 02/16/2018      HISTORY OF PRESENT ILLNESS: Mr. Katie Ye is very interesting and pleasant 45-year-old lady who comes in routine followup.  She has a history of Churg-Antonina vasculitis, Mannose-binding lectin hypertension, interstitial lung disease, and asthma.  She has had a pericardial effusion and probable pericarditis in the past.  We had evaluated the patient with a number of tests in response to complaints of fatigue with exertion and difficulty with exercising.  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  PET scan was subsequently performed which demonstrated an actually globally reduced perfusion which was described as concerning for multivessel coronary disease but frankly potentially related to her scarring.  Abnormal wall motion was described, however, gating was likely problematic with the patient's frequent ectopy.  We had ordered a Holter monitor, the results of which are not yet available.  Ms. Katie Ye's last Holter in 07/2017 demonstrated a short run of SVT, otherwise a low burden of ventricular ectopy.      Ms. Katie Ye describes chest pain lasting seconds to a few minutes at a time.  It is experienced as a pressure or ache in the center of her chest radiating to her back and shoulders, usually onset at rest.  She can do a half hour of aerobics more days than not, with no limitations and no reproduction of the chest discomfort.  She denies tobacco abuse, but does have a family history of heart disease with her father having had a myocardial  Subjective   Jefferson Pena is a 53 y.o. female.     History of Present Illness The patient comes in for check of their chronic medical issues which include allergies and difficulty with breathing attimes . She is doing well. She has complaint of swelling in her left foot. This has been going on for a month. She also has been having some coughing and missed work 3 days ago..      The following portions of the patient's history were reviewed and updated as appropriate: allergies, current medications, past family history, past medical history, past social history, past surgical history and problem list.    Review of Systems   Constitutional: Negative for fatigue and fever.   Respiratory: Positive for cough. Negative for chest tightness and wheezing.    Cardiovascular: Negative for chest pain, palpitations and leg swelling.       Objective   Physical Exam   Constitutional: She appears well-developed and well-nourished.   HENT:   Head: Normocephalic and atraumatic.   Right Ear: External ear normal.   Left Ear: External ear normal.   Nose: Nose normal.   Mouth/Throat: Oropharynx is clear and moist.   Eyes: Pupils are equal, round, and reactive to light.   Neck: Normal range of motion.   Cardiovascular: Normal rate, regular rhythm and normal heart sounds.  Exam reveals no gallop and no friction rub.    No murmur heard.  There are varicosities on both legs. She has some swelling of the left foot.   Pulmonary/Chest: Effort normal. No respiratory distress. She has no wheezes. She has no rales.   Abdominal: Soft. Bowel sounds are normal. She exhibits no distension. There is no tenderness. There is no guarding.   Skin: Skin is warm and dry.   Vitals reviewed.        Assessment/Plan   Jefferson was seen today for follow-up.    Diagnoses and all orders for this visit:    Edema of left foot    Chronic allergic rhinitis due to other allergic trigger, unspecified seasonality    Varicose veins of left leg with edema    Other orders  -      desloratadine (CLARINEX) 5 MG tablet; Take 1 tablet by mouth Daily.        She is to wear a compression stocking on her left leg to help with the swelling.  Return to clinic in 6 months.  Will contact with results as needed.        infarction at the age of 50-51 with stents implanted.  She denies diabetes but is hypertensive.  She is not dyslipidemic.      Of note, she still has palpitations throughout the day.  She reduces her caffeine intake and cannot find a clear correlation at this point.  They are nonexertional.      ASSESSMENT AND PLAN:  A pleasant 45-year-old lady with Churg-Antonina vasculitis with cardiac involvement as outlined above.  It does not appear to be active on PET scanning.  There was a comment on the PET scan that her diffuse decreased perfusion could be related to multivessel coronary disease.  It is most likely related to her eosinophilic myocarditis, however, she also has ongoing risk factors of hypertension and premature coronary disease history in her family, as well as chest discomfort, albeit atypical.  In the light of this, I would recommend a CT coronary angiogram for definitive evaluation.  I have suggested she take a full metoprolol rather than half the night before her CT as she takes her medications at night.      From the standpoint of overall risk stratification, we will check a lipoprotein(a) and lipids and make further recommendations pending the course as well.      Given her ongoing palpitations, I do think it would be reasonable to see Electrophysiology once for evaluation in the setting of her Churg-Antonina vasculitis and cardiac infiltrates which likely cause areas of potential arrhythmia generation as scar.      We will see her back in followup of the above and make further recommendations at that time.      It was a pleasure being involved in her care.  Ms. Katie Ye has quite a number of very astute questions regarding her course and expected course and I think it would be helpful to get further information from the CT coronary angiogram for more definitive prognostic information.      Total time is 45 minutes, 30 in coordination, care and counseling.      Yvrose Moeller MD      cc:    Morelia Simental MD   [facility]   [address]   [place] [address]         SCARLETT ENRIQUE MD             D: 2018   T: 2018   MT: al      Name:     NABOR BUENROSTRO   MRN:      [id number]        Account:      [id number]   :      1972           Service Date: 2018      Document: [id number]         Outpatient Encounter Prescriptions as of 2018   Medication Sig Dispense Refill     metoprolol succinate (TOPROL-XL) 25 MG 24 hr tablet Take 0.5 tablets (12.5 mg) by mouth daily 45 tablet 3     predniSONE (DELTASONE) 1 MG tablet TAKE 5 TABLETS BY MOUTH ONE DAY AND ALTERNATING WITH 6 TABLETS THE NEXT. 200 tablet 0     fluticasone (FLONASE) 50 MCG/ACT spray SHAKE LIQUID AND USE 2 SPRAYS IN EACH NOSTRIL DAILY 48 mL 1     Probiotic Product (PROBIOTIC ADVANCED PO)        Ipratropium-Albuterol (COMBIVENT RESPIMAT)  MCG/ACT inhaler Inhale 1 puff into the lungs 4 times daily Not to exceed 6 doses per day. 1 Inhaler 11     clonazePAM (KLONOPIN) 0.5 MG tablet Take 0.5 tablets (0.25 mg) by mouth 2 times daily as needed for anxiety 20 tablet 1     acetylcysteine (MUCOMYST) 20 % injection Take 4 mLs by nebulization 2 times daily Use with albuterol solution. Discard open bottle of Mucomyst after 96 hours. 240 mL 11     albuterol (2.5 MG/3ML) 0.083% neb solution Take 1 vial (2.5 mg) by nebulization 2 times daily Use with Mucomyst 60 vial 11     budesonide-formoterol (SYMBICORT) 160-4.5 MCG/ACT Inhaler Inhale 2 puffs into the lungs 2 times daily 1 Inhaler 11     UNABLE TO FIND 3,600 mg daily MEDICATION NAME: monolaurin       ciprofloxacin-dexamethasone (CIPRODEX) otic suspension Instill 3 drops into the affected ear twice daily as needed. 7.5 mL 1     cholecalciferol 5000 UNITS CAPS Take by mouth daily       glucosamine-chondroitin (GLUCOSAMINE CHONDR COMPLEX) 500-400 MG CAPS Take 2 capsules by mouth daily.       Omega-3 Fatty Acids (FISH OIL) 1200 MG capsule  Take 3 capsules by mouth daily.       [DISCONTINUED] metoprolol (TOPROL-XL) 25 MG 24 hr tablet Take 0.5 tablets (12.5 mg) by mouth daily 30 tablet 2     No facility-administered encounter medications on file as of 2/16/2018.      Again, thank you for allowing me to participate in the care of your patient.      Sincerely,    Yvrose Moeller MD     [facility]

## 2019-02-06 ENCOUNTER — OFFICE VISIT (OUTPATIENT)
Dept: FAMILY MEDICINE CLINIC | Facility: CLINIC | Age: 54
End: 2019-02-06

## 2019-02-06 VITALS
OXYGEN SATURATION: 99 % | WEIGHT: 194.31 LBS | HEART RATE: 76 BPM | BODY MASS INDEX: 32.37 KG/M2 | HEIGHT: 65 IN | SYSTOLIC BLOOD PRESSURE: 146 MMHG | DIASTOLIC BLOOD PRESSURE: 70 MMHG

## 2019-02-06 DIAGNOSIS — J40 BRONCHITIS: Primary | ICD-10-CM

## 2019-02-06 DIAGNOSIS — R05.9 COUGH: ICD-10-CM

## 2019-02-06 PROCEDURE — 99213 OFFICE O/P EST LOW 20 MIN: CPT | Performed by: FAMILY MEDICINE

## 2019-02-06 RX ORDER — AZITHROMYCIN 250 MG/1
TABLET, FILM COATED ORAL
Qty: 6 TABLET | Refills: 0 | Status: SHIPPED | OUTPATIENT
Start: 2019-02-06 | End: 2019-02-15

## 2019-02-06 RX ORDER — ALBUTEROL SULFATE 90 UG/1
AEROSOL, METERED RESPIRATORY (INHALATION)
Qty: 18 G | Refills: 1 | Status: SHIPPED | OUTPATIENT
Start: 2019-02-06 | End: 2019-08-12

## 2019-02-06 NOTE — PROGRESS NOTES
Subjective   Jefferson Pena is a 53 y.o. female.    cc:cough  History of Present Illness The patient comes in with cough and congestion She has been sick for about a week and a half. She has missed work and needs an excuse.    The following portions of the patient's history were reviewed and updated as appropriate: allergies, current medications, past family history, past medical history, past social history, past surgical history and problem list.    Review of Systems   Constitutional: Negative for fatigue and fever.   Respiratory: Positive for cough. Negative for chest tightness, wheezing and stridor.    Cardiovascular: Negative for chest pain, palpitations and leg swelling.       Objective   Physical Exam   Constitutional: She appears well-developed and well-nourished.   HENT:   Head: Normocephalic and atraumatic.   Right Ear: External ear normal.   Left Ear: External ear normal.   Mouth/Throat: Oropharynx is clear and moist.   Nasal congestion.   Cardiovascular: Normal rate, regular rhythm and normal heart sounds. Exam reveals no gallop and no friction rub.   No murmur heard.  Pulmonary/Chest: Effort normal and breath sounds normal. No stridor. No respiratory distress. She has no wheezes. She has no rales.   Abdominal: Soft. Bowel sounds are normal. She exhibits no distension. There is no tenderness. There is no guarding.   Skin: Skin is warm and dry.   Vitals reviewed.        Assessment/Plan   Jefferson was seen today for follow-up, nasal congestion and cough.    Diagnoses and all orders for this visit:    Bronchitis    Cough    Other orders  -     albuterol sulfate  (90 Base) MCG/ACT inhaler; 2 puffs morning, noon,4 o'clock and at bedtime for one week,then 4 times daily as needed.  -     azithromycin (ZITHROMAX) 250 MG tablet; Take 2 tablets the first day, then 1 tablet daily for 4 days.    Return as needed.

## 2019-02-15 ENCOUNTER — OFFICE VISIT (OUTPATIENT)
Dept: FAMILY MEDICINE CLINIC | Facility: CLINIC | Age: 54
End: 2019-02-15

## 2019-02-15 VITALS
BODY MASS INDEX: 32.59 KG/M2 | HEART RATE: 77 BPM | RESPIRATION RATE: 18 BRPM | TEMPERATURE: 96.3 F | OXYGEN SATURATION: 98 % | SYSTOLIC BLOOD PRESSURE: 158 MMHG | HEIGHT: 65 IN | WEIGHT: 195.6 LBS | DIASTOLIC BLOOD PRESSURE: 98 MMHG

## 2019-02-15 DIAGNOSIS — R05.9 COUGH: ICD-10-CM

## 2019-02-15 DIAGNOSIS — J01.40 ACUTE NON-RECURRENT PANSINUSITIS: Primary | ICD-10-CM

## 2019-02-15 DIAGNOSIS — M79.10 MYALGIA: ICD-10-CM

## 2019-02-15 DIAGNOSIS — R19.7 DIARRHEA, UNSPECIFIED TYPE: ICD-10-CM

## 2019-02-15 LAB
EXPIRATION DATE: NORMAL
FLUAV AG NPH QL: NEGATIVE
FLUBV AG NPH QL: NEGATIVE
INTERNAL CONTROL: NORMAL
Lab: NORMAL

## 2019-02-15 PROCEDURE — 87804 INFLUENZA ASSAY W/OPTIC: CPT | Performed by: NURSE PRACTITIONER

## 2019-02-15 PROCEDURE — 99213 OFFICE O/P EST LOW 20 MIN: CPT | Performed by: NURSE PRACTITIONER

## 2019-02-15 RX ORDER — SACCHAROMYCES BOULARDII 250 MG
250 CAPSULE ORAL 2 TIMES DAILY
Qty: 28 CAPSULE | Refills: 0 | Status: SHIPPED | OUTPATIENT
Start: 2019-02-15 | End: 2019-03-01

## 2019-02-15 RX ORDER — DOXYCYCLINE HYCLATE 100 MG/1
100 CAPSULE ORAL 2 TIMES DAILY
Qty: 20 CAPSULE | Refills: 0 | Status: SHIPPED | OUTPATIENT
Start: 2019-02-15 | End: 2019-04-04

## 2019-02-15 NOTE — PROGRESS NOTES
Subjective   Jefferson Pena is a 53 y.o. female.     Ms. Pena is a 53-year-old female who presents today with a 3-day history of cough nasal congestion and a 1 day history of diarrhea.  She was seen by her PCP on February 6 and treated for bronchitis with a Z-Lex and an albuterol inhaler as needed.  She states that she had total resolution of her symptoms after completing antibiotic.  However 2-3 days ago she developed cough nasal congestion sinus pressure yellow-green nasal discharge.  Denies shortness of breath chest pain palpitations chest tightness and wheezing.  No nausea or vomiting but does state she had 2 episodes of diarrhea yesterday and one this morning.  Denies watery diarrhea.  She is currently taking no over-the-counter medication.         The following portions of the patient's history were reviewed and updated as appropriate: allergies, current medications, past family history, past medical history, past social history, past surgical history and problem list.    Review of Systems   Constitutional: Negative for activity change, appetite change, fatigue, unexpected weight gain and unexpected weight loss.   HENT: Positive for congestion, rhinorrhea, sinus pressure and sore throat. Negative for trouble swallowing and voice change.    Eyes: Negative.    Respiratory: Positive for cough. Negative for chest tightness, shortness of breath and wheezing.    Cardiovascular: Negative for chest pain, palpitations and leg swelling.   Gastrointestinal: Positive for diarrhea. Negative for abdominal pain, constipation, nausea and vomiting.   Endocrine: Negative.    Genitourinary: Negative for dysuria.   Musculoskeletal: Negative for arthralgias and myalgias.   Skin: Negative for rash.   Allergic/Immunologic: Negative.    Neurological: Positive for headache.   Hematological: Negative.    Psychiatric/Behavioral: Negative.        Objective   Physical Exam   Constitutional: She is oriented to person, place, and time.  She appears well-developed and well-nourished. No distress.   HENT:   Head: Normocephalic and atraumatic.   Right Ear: External ear normal.   Left Ear: External ear normal.   Nose: Right sinus exhibits maxillary sinus tenderness and frontal sinus tenderness. Left sinus exhibits maxillary sinus tenderness and frontal sinus tenderness.   Mouth/Throat: Oropharynx is clear and moist.   Eyes: Conjunctivae are normal.   Neck: Normal range of motion.   Cardiovascular: Normal rate, regular rhythm and normal heart sounds.   Pulmonary/Chest: Effort normal and breath sounds normal. No stridor. No respiratory distress. She has no wheezes. She has no rales.   Abdominal: Soft. Bowel sounds are normal. She exhibits no distension. There is no tenderness.   Musculoskeletal: Normal range of motion. She exhibits no edema or tenderness.   Lymphadenopathy:     She has no cervical adenopathy.   Neurological: She is alert and oriented to person, place, and time.   Skin: Skin is warm and dry. No rash noted. She is not diaphoretic. No erythema. No pallor.   Psychiatric: She has a normal mood and affect. Her behavior is normal. Judgment and thought content normal.   Nursing note and vitals reviewed.        Assessment/Plan   Jefferson was seen today for cough, nasal congestion and diarrhea.    Diagnoses and all orders for this visit:    Acute non-recurrent pansinusitis    Cough  -     POCT Influenza A/B    Myalgia  -     POCT Influenza A/B    Diarrhea, unspecified type    Other orders  -     doxycycline (VIBRAMYCIN) 100 MG capsule; Take 1 capsule by mouth 2 (Two) Times a Day.  -     saccharomyces boulardii (FLORASTOR) 250 MG capsule; Take 1 capsule by mouth 2 (Two) Times a Day for 14 days.      1.  Acute nonrecurrent pansinusitis-doxycycline 100 mg 1 capsule twice a day for 10 days.  Considering patient's recent antibiotic use she is also prescribed Florastor 250 mg 1 capsule twice a day for 2 weeks.  Patient also encouraged to eat yogurt daily.   Patient instructed to present for follow-up if she develops watery diarrhea.  Patient verbalized understanding of instruction.    2.  Cough/myalgia-influenza swab negative.    3.  Diarrhea- instructed patient to eat a bland diet and avoid dairy until diarrhea resolves.  If diarrhea does not resolve in 2-3 days or worsens patient instructed to seek follow-up care.    4.  Follow-up in 4-5 days if there is no improvement in symptoms or sooner if needed.            This document has been electronically signed by JENS Kramer on February 15, 2019 4:44 PM

## 2019-04-04 ENCOUNTER — OFFICE VISIT (OUTPATIENT)
Dept: FAMILY MEDICINE CLINIC | Facility: CLINIC | Age: 54
End: 2019-04-04

## 2019-04-04 VITALS
DIASTOLIC BLOOD PRESSURE: 80 MMHG | BODY MASS INDEX: 34.2 KG/M2 | SYSTOLIC BLOOD PRESSURE: 138 MMHG | WEIGHT: 200.31 LBS | HEART RATE: 78 BPM | OXYGEN SATURATION: 98 % | HEIGHT: 64 IN

## 2019-04-04 DIAGNOSIS — M25.531 ARTHRALGIA OF RIGHT WRIST: ICD-10-CM

## 2019-04-04 DIAGNOSIS — H11.32 SUBCONJUNCTIVAL HEMORRHAGE OF LEFT EYE: Primary | ICD-10-CM

## 2019-04-04 PROCEDURE — 99213 OFFICE O/P EST LOW 20 MIN: CPT | Performed by: FAMILY MEDICINE

## 2019-04-04 RX ORDER — PREDNISONE 10 MG/1
TABLET ORAL
Qty: 30 TABLET | Refills: 0 | Status: SHIPPED | OUTPATIENT
Start: 2019-04-04 | End: 2019-07-02

## 2019-04-04 NOTE — PROGRESS NOTES
Subjective   Jefferson Pena is a 54 y.o. female.   Cc: wrist pain and irritated eye.  History of Present Illness The patient comes in for check of her left eye. She has what looks like a subconjunctival hemorrhage.SHe also has had tenderness in the right wrist.    The following portions of the patient's history were reviewed and updated as appropriate: allergies, current medications, past family history, past medical history, past social history, past surgical history and problem list.    Review of Systems   Constitutional: Negative for fatigue and fever.   Eyes: Negative for blurred vision, double vision, pain and itching.   Respiratory: Negative for chest tightness and stridor.    Cardiovascular: Negative for chest pain, palpitations and leg swelling.   Musculoskeletal: Positive for arthralgias. Negative for back pain.       Objective   Physical Exam   Constitutional: She appears well-developed and well-nourished.   HENT:   Head: Normocephalic and atraumatic.   Right Ear: External ear normal.   Left Ear: External ear normal.   Nose: Nose normal.   Mouth/Throat: Oropharynx is clear and moist.   Eyes: Pupils are equal, round, and reactive to light.   There is a small sub conjunctival hemorrhage present.    Cardiovascular: Normal rate, regular rhythm and normal heart sounds. Exam reveals no gallop and no friction rub.   No murmur heard.  Pulmonary/Chest: Effort normal and breath sounds normal. No stridor. No respiratory distress. She has no wheezes. She has no rales.   Abdominal: Soft. Bowel sounds are normal. She exhibits no distension. There is no tenderness.   Musculoskeletal:   Some tenderness at the Carpal Metacarpal joint of the right thumb. Good range of motion.   Skin: Skin is warm and dry.   Vitals reviewed.        Assessment/Plan   Jefferson was seen today for skin lesion.    Diagnoses and all orders for this visit:    Subconjunctival hemorrhage of left eye    Arthralgia of right wrist    Other orders  -      predniSONE (DELTASONE) 10 MG tablet; Take 4 tablets by mouth daily for 3 days, 3 tablets daily for 3 days, 2 tablets daily for 3 days, then 1 tablet daily for 3 days, then stop.      Return to the clinic in 3 month/s.  Will contact with results as needed.    Monitor the eye.  Prednisone as directed.

## 2019-07-02 ENCOUNTER — OFFICE VISIT (OUTPATIENT)
Dept: FAMILY MEDICINE CLINIC | Facility: CLINIC | Age: 54
End: 2019-07-02

## 2019-07-02 VITALS
DIASTOLIC BLOOD PRESSURE: 62 MMHG | SYSTOLIC BLOOD PRESSURE: 124 MMHG | HEIGHT: 64 IN | HEART RATE: 72 BPM | OXYGEN SATURATION: 98 % | WEIGHT: 196.5 LBS | BODY MASS INDEX: 33.55 KG/M2

## 2019-07-02 DIAGNOSIS — M79.10 MYALGIA: ICD-10-CM

## 2019-07-02 DIAGNOSIS — M79.604 PAIN IN BOTH LOWER EXTREMITIES: Primary | ICD-10-CM

## 2019-07-02 DIAGNOSIS — M79.605 PAIN IN BOTH LOWER EXTREMITIES: Primary | ICD-10-CM

## 2019-07-02 PROCEDURE — 99214 OFFICE O/P EST MOD 30 MIN: CPT | Performed by: FAMILY MEDICINE

## 2019-07-02 RX ORDER — NAPROXEN 250 MG/1
250 TABLET ORAL 2 TIMES DAILY PRN
Qty: 60 TABLET | Refills: 1 | Status: SHIPPED | OUTPATIENT
Start: 2019-07-02 | End: 2019-08-12 | Stop reason: SDUPTHER

## 2019-07-02 NOTE — PROGRESS NOTES
Subjective   Jefferson Pena is a 54 y.o. female.    cc: leg pain  History of Present Illness The patient comes in with c/o pain in her legs on standing. She denies injury. Her feet tend to roll inwardly. The soles on her shoes are worn on the medial aspect of the soles.    The following portions of the patient's history were reviewed and updated as appropriate: allergies, current medications, past family history, past medical history, past social history, past surgical history and problem list.    Review of Systems   Constitutional: Negative for fatigue and fever.   Respiratory: Negative for cough, chest tightness and stridor.    Cardiovascular: Negative for chest pain, palpitations and leg swelling.       Objective   Physical Exam   Constitutional: She appears well-developed and well-nourished.   HENT:   Head: Normocephalic and atraumatic.   Right Ear: External ear normal.   Left Ear: External ear normal.   Nose: Nose normal.   Mouth/Throat: Oropharynx is clear and moist.   Eyes: Conjunctivae are normal.   Neck: Normal range of motion.   Cardiovascular: Normal rate, regular rhythm and normal heart sounds. Exam reveals no gallop and no friction rub.   No murmur heard.  Pulmonary/Chest: Effort normal and breath sounds normal. No stridor. No respiratory distress. She has no wheezes.   Abdominal: Soft. Bowel sounds are normal.   Musculoskeletal:   She has tenderness on palpation of her distal outer thighs and knees bilaterally.   Vitals reviewed.        Assessment/Plan   Jefferson was seen today for follow-up.    Diagnoses and all orders for this visit:    Pain in both lower extremities    Myalgia    Other orders  -     naproxen (NAPROSYN) 250 MG tablet; Take 1 tablet by mouth 2 (Two) Times a Day As Needed for Mild Pain .        Return to the clinic in 3 month/s.  Will contact with results as needed.

## 2019-08-12 ENCOUNTER — OFFICE VISIT (OUTPATIENT)
Dept: FAMILY MEDICINE CLINIC | Facility: CLINIC | Age: 54
End: 2019-08-12

## 2019-08-12 VITALS
OXYGEN SATURATION: 98 % | DIASTOLIC BLOOD PRESSURE: 84 MMHG | WEIGHT: 194.5 LBS | HEIGHT: 64 IN | HEART RATE: 60 BPM | SYSTOLIC BLOOD PRESSURE: 124 MMHG | BODY MASS INDEX: 33.2 KG/M2

## 2019-08-12 DIAGNOSIS — M54.9 CHRONIC BILATERAL BACK PAIN, UNSPECIFIED BACK LOCATION: ICD-10-CM

## 2019-08-12 DIAGNOSIS — M79.672 PAIN IN BOTH FEET: Primary | ICD-10-CM

## 2019-08-12 DIAGNOSIS — M79.671 PAIN IN BOTH FEET: Primary | ICD-10-CM

## 2019-08-12 DIAGNOSIS — G89.29 CHRONIC BILATERAL BACK PAIN, UNSPECIFIED BACK LOCATION: ICD-10-CM

## 2019-08-12 PROCEDURE — 99214 OFFICE O/P EST MOD 30 MIN: CPT | Performed by: FAMILY MEDICINE

## 2019-08-12 PROCEDURE — 96372 THER/PROPH/DIAG INJ SC/IM: CPT | Performed by: FAMILY MEDICINE

## 2019-08-12 RX ORDER — TRIAMCINOLONE ACETONIDE 40 MG/ML
80 INJECTION, SUSPENSION INTRA-ARTICULAR; INTRAMUSCULAR ONCE
Status: COMPLETED | OUTPATIENT
Start: 2019-08-12 | End: 2019-08-13

## 2019-08-12 RX ORDER — NAPROXEN 250 MG/1
250 TABLET ORAL 2 TIMES DAILY PRN
Qty: 60 TABLET | Refills: 1 | Status: SHIPPED | OUTPATIENT
Start: 2019-08-12 | End: 2019-08-20 | Stop reason: DRUGHIGH

## 2019-08-12 NOTE — PROGRESS NOTES
"Subjective   Jefferson Pena is a 54 y.o. female.    cc\" follow up  History of Present Illness The patient comes infor check of her feet. Her feet hurt to stand on them. She also is having pain in her low back.    The following portions of the patient's history were reviewed and updated as appropriate: allergies, current medications, past family history, past medical history, past social history, past surgical history and problem list.    Review of Systems   Constitutional: Negative for fatigue and fever.   Respiratory: Negative for cough, chest tightness and stridor.    Cardiovascular: Negative for chest pain, palpitations and leg swelling.       Objective   Physical Exam   Constitutional: She appears well-developed and well-nourished.   HENT:   Head: Normocephalic and atraumatic.   Right Ear: External ear normal.   Left Ear: External ear normal.   Nose: Nose normal.   Mouth/Throat: Oropharynx is clear and moist.   Eyes: Conjunctivae are normal.   Neck: Normal range of motion. JVD: kenalog.   Cardiovascular: Normal rate, regular rhythm and normal heart sounds. Exam reveals no gallop and no friction rub.   No murmur heard.  Pulmonary/Chest: Effort normal and breath sounds normal. No stridor. No respiratory distress. She has no wheezes. She has no rales.   Abdominal: Soft. Bowel sounds are normal. She exhibits no distension. There is no tenderness.   Musculoskeletal:   There is tenderness in the low back in the small of the back.   Skin: Skin is warm and dry.   Vitals reviewed.        Assessment/Plan   Jefferson was seen today for follow-up.    Diagnoses and all orders for this visit:    Pain in both feet  -     triamcinolone acetonide (KENALOG-40) injection 80 mg    Chronic bilateral back pain, unspecified back location  -     triamcinolone acetonide (KENALOG-40) injection 80 mg    Other orders  -     naproxen (NAPROSYN) 250 MG tablet; Take 1 tablet by mouth 2 (Two) Times a Day As Needed for Mild Pain.  **Take with " food**      Back to work in two days.

## 2019-08-13 RX ADMIN — TRIAMCINOLONE ACETONIDE 80 MG: 40 INJECTION, SUSPENSION INTRA-ARTICULAR; INTRAMUSCULAR at 09:30

## 2019-08-20 ENCOUNTER — OFFICE VISIT (OUTPATIENT)
Dept: FAMILY MEDICINE CLINIC | Facility: CLINIC | Age: 54
End: 2019-08-20

## 2019-08-20 VITALS
WEIGHT: 195.31 LBS | HEART RATE: 78 BPM | OXYGEN SATURATION: 98 % | DIASTOLIC BLOOD PRESSURE: 102 MMHG | BODY MASS INDEX: 33.34 KG/M2 | HEIGHT: 64 IN | SYSTOLIC BLOOD PRESSURE: 164 MMHG

## 2019-08-20 DIAGNOSIS — M62.838 MUSCLE SPASM: ICD-10-CM

## 2019-08-20 DIAGNOSIS — M54.40 LOW BACK PAIN WITH SCIATICA, SCIATICA LATERALITY UNSPECIFIED, UNSPECIFIED BACK PAIN LATERALITY, UNSPECIFIED CHRONICITY: Primary | ICD-10-CM

## 2019-08-20 PROCEDURE — 99214 OFFICE O/P EST MOD 30 MIN: CPT | Performed by: FAMILY MEDICINE

## 2019-08-20 RX ORDER — METHOCARBAMOL 500 MG/1
500 TABLET, FILM COATED ORAL 4 TIMES DAILY
Qty: 56 TABLET | Refills: 1 | Status: SHIPPED | OUTPATIENT
Start: 2019-08-20 | End: 2020-01-20

## 2019-08-20 RX ORDER — NAPROXEN 500 MG/1
500 TABLET ORAL 2 TIMES DAILY WITH MEALS
Qty: 60 TABLET | Refills: 1 | Status: SHIPPED | OUTPATIENT
Start: 2019-08-20 | End: 2020-01-20

## 2019-08-20 NOTE — PROGRESS NOTES
Subjective   Jefferson Pena is a 54 y.o. female.    cc: follow up  History of Present Illness The patient comes in for recheck of her low back pain and  Left hip pain. She is still having some pain issue.    The following portions of the patient's history were reviewed and updated as appropriate: allergies, current medications, past family history, past medical history, past social history, past surgical history and problem list.    Review of Systems   Constitutional: Negative for fatigue and fever.   Respiratory: Negative for cough, chest tightness and stridor.    Cardiovascular: Negative for chest pain, palpitations and leg swelling.   Musculoskeletal: Positive for arthralgias, back pain and myalgias. Negative for gait problem and joint swelling.       Objective   Physical Exam   Constitutional: She appears well-developed and well-nourished.   HENT:   Head: Normocephalic and atraumatic.   Right Ear: External ear normal.   Left Ear: External ear normal.   Nose: Nose normal.   Mouth/Throat: Oropharynx is clear and moist.   Eyes: Conjunctivae are normal.   Neck: Normal range of motion.   Cardiovascular: Normal rate, regular rhythm and normal heart sounds. Exam reveals no gallop and no friction rub.   No murmur heard.  Pulmonary/Chest: Effort normal and breath sounds normal. No stridor. No respiratory distress. She has no wheezes. She has no rales.   Abdominal: Soft. Bowel sounds are normal. She exhibits no distension. There is no tenderness. There is no guarding.   Musculoskeletal:   The low back is tender to palpation . Her hip is tender to flexion and extension . It also is tender with internal and external rotation.   Skin: Skin is warm.   Vitals reviewed.        Assessment/Plan   Jefferson was seen today for medication reaction.    Diagnoses and all orders for this visit:    Low back pain with sciatica, sciatica laterality unspecified, unspecified back pain laterality, unspecified chronicity    Muscle spasm    Other  orders  -     naproxen (NAPROSYN) 500 MG tablet; Take 1 tablet by mouth 2 (Two) Times a Day With Meals.  -     methocarbamol (ROBAXIN) 500 MG tablet; Take 1 tablet by mouth 4 (Four) Times a Day.          Return as needed.

## 2019-09-09 ENCOUNTER — OFFICE VISIT (OUTPATIENT)
Dept: FAMILY MEDICINE CLINIC | Facility: CLINIC | Age: 54
End: 2019-09-09

## 2019-09-09 VITALS
HEART RATE: 81 BPM | WEIGHT: 196.38 LBS | DIASTOLIC BLOOD PRESSURE: 84 MMHG | HEIGHT: 64 IN | SYSTOLIC BLOOD PRESSURE: 148 MMHG | BODY MASS INDEX: 33.53 KG/M2 | OXYGEN SATURATION: 98 %

## 2019-09-09 DIAGNOSIS — M79.605 PAIN IN BOTH LOWER EXTREMITIES: ICD-10-CM

## 2019-09-09 DIAGNOSIS — G89.29 CHRONIC MIDLINE LOW BACK PAIN WITHOUT SCIATICA: Primary | ICD-10-CM

## 2019-09-09 DIAGNOSIS — M79.604 PAIN IN BOTH LOWER EXTREMITIES: ICD-10-CM

## 2019-09-09 DIAGNOSIS — M54.50 CHRONIC MIDLINE LOW BACK PAIN WITHOUT SCIATICA: Primary | ICD-10-CM

## 2019-09-09 PROCEDURE — 99213 OFFICE O/P EST LOW 20 MIN: CPT | Performed by: FAMILY MEDICINE

## 2019-09-09 NOTE — PROGRESS NOTES
Subjective   Jefferson Pena is a 54 y.o. female.    cc: follow up for chronic medical issues.  History of Present Illness The patient comes in for follow up of her low back and leg pain. She is hurting from time to time in her low back .    The following portions of the patient's history were reviewed and updated as appropriate: allergies, current medications, past family history, past medical history, past social history, past surgical history and problem list.    Review of Systems   Constitutional: Negative for fatigue and fever.   Respiratory: Negative for cough, chest tightness and stridor.    Cardiovascular: Negative for chest pain, palpitations and leg swelling.       Objective   Physical Exam   Constitutional: She is oriented to person, place, and time. She appears well-developed and well-nourished.   HENT:   Head: Normocephalic and atraumatic.   Right Ear: External ear normal.   Left Ear: External ear normal.   Nose: Nose normal.   Mouth/Throat: Oropharynx is clear and moist.   Eyes: Conjunctivae are normal.   Neck: Normal range of motion.   Cardiovascular: Normal rate, regular rhythm and normal heart sounds. Exam reveals no gallop and no friction rub.   No murmur heard.  Pulmonary/Chest: Effort normal and breath sounds normal. No stridor. No respiratory distress. She has no wheezes. She has no rales.   Abdominal: Soft. Bowel sounds are normal. She exhibits no distension. There is no tenderness. There is no guarding.   Musculoskeletal:   She is tender on palpation of the low back and lower extremities.   Neurological: She is alert and oriented to person, place, and time.   Skin: Skin is warm and dry.   Vitals reviewed.        Assessment/Plan   Jefferson was seen today for pinched nerve and back pain.    Diagnoses and all orders for this visit:    Chronic midline low back pain without sciatica    Pain in both lower extremities        Return to the clinic in 3 month/s.  Will contact with results as needed.

## 2019-10-07 ENCOUNTER — TELEPHONE (OUTPATIENT)
Dept: FAMILY MEDICINE CLINIC | Facility: CLINIC | Age: 54
End: 2019-10-07

## 2019-10-07 NOTE — TELEPHONE ENCOUNTER
Pt needs a medical certification sent to [facility], please call [phone] ext [phone] to find out what needs to be sent. Pt says they need as soon as possible. Thanks!

## 2019-11-04 ENCOUNTER — PROCEDURE VISIT (OUTPATIENT)
Dept: OBSTETRICS AND GYNECOLOGY | Facility: CLINIC | Age: 54
End: 2019-11-04

## 2019-11-04 VITALS
WEIGHT: 199.6 LBS | BODY MASS INDEX: 34.08 KG/M2 | SYSTOLIC BLOOD PRESSURE: 128 MMHG | HEIGHT: 64 IN | DIASTOLIC BLOOD PRESSURE: 80 MMHG

## 2019-11-04 DIAGNOSIS — Z12.31 SCREENING MAMMOGRAM, ENCOUNTER FOR: ICD-10-CM

## 2019-11-04 DIAGNOSIS — Z01.419 ENCOUNTER FOR GYNECOLOGICAL EXAMINATION WITHOUT ABNORMAL FINDING: Primary | ICD-10-CM

## 2019-11-04 PROCEDURE — 99396 PREV VISIT EST AGE 40-64: CPT | Performed by: OBSTETRICS & GYNECOLOGY

## 2019-11-04 PROCEDURE — G0123 SCREEN CERV/VAG THIN LAYER: HCPCS | Performed by: OBSTETRICS & GYNECOLOGY

## 2019-11-04 PROCEDURE — 87624 HPV HI-RISK TYP POOLED RSLT: CPT | Performed by: OBSTETRICS & GYNECOLOGY

## 2019-11-08 LAB
GEN CATEG CVX/VAG CYTO-IMP: NORMAL
LAB AP CASE REPORT: NORMAL
LAB AP GYN ADDITIONAL INFORMATION: NORMAL
PATH INTERP SPEC-IMP: NORMAL
STAT OF ADQ CVX/VAG CYTO-IMP: NORMAL

## 2019-11-10 PROBLEM — Z12.31 SCREENING MAMMOGRAM, ENCOUNTER FOR: Status: ACTIVE | Noted: 2019-11-10

## 2019-11-10 PROBLEM — Z01.419 ENCOUNTER FOR GYNECOLOGICAL EXAMINATION WITHOUT ABNORMAL FINDING: Status: ACTIVE | Noted: 2019-11-10

## 2019-11-11 NOTE — PROGRESS NOTES
"Chief complaint request Pap smear                                                                                                                                                                                                                                                                               sUBJECTIVE:   54 y.o. female for annual routine Pap and checkup.  Current Outpatient Medications   Medication Sig Dispense Refill   • desloratadine (CLARINEX) 5 MG tablet Take 1 tablet by mouth Daily. 15 tablet 1   • methocarbamol (ROBAXIN) 500 MG tablet Take 1 tablet by mouth 4 (Four) Times a Day. 56 tablet 1   • Multiple Vitamins-Minerals (MULTIVITAMIN WITH MINERALS) tablet tablet Take 1 tablet by mouth Daily.     • naproxen (NAPROSYN) 500 MG tablet Take 1 tablet by mouth 2 (Two) Times a Day With Meals. 60 tablet 1     No current facility-administered medications for this visit.      Allergies: Sulfa antibiotics and Penicillins   Patient's last menstrual period was 07/06/2004 (within months).    ROS:  Feeling well. No dyspnea or chest pain on exertion.  No abdominal pain, change in bowel habits, black or bloody stools.  No urinary tract symptoms. GYN ROS: no breast pain or new or enlarging lumps on self exam, no vaginal bleeding. No neurological complaints.    OBJECTIVE:   The patient appears well, alert, oriented x 3, in no distress.  /80   Ht 162.6 cm (64\")   Wt 90.5 kg (199 lb 9.6 oz)   LMP 07/06/2004 (Within Months)   Breastfeeding? No   BMI 34.26 kg/m²   ENT normal.  Neck supple. No adenopathy or thyromegaly. NATTY. Lungs are clear, good air entry, no wheezes, rhonchi or rales. S1 and S2 normal, no murmurs, regular rate and rhythm. Abdomen soft without tenderness, guarding, mass or organomegaly. Extremities show no edema, normal peripheral pulses. Neurological is normal, no focal findings.    BREAST EXAM: breasts appear normal, no suspicious masses, no skin or nipple changes or axillary " nodes    PELVIC EXAM: normal external genitalia, vulva, vagina, cervix, uterus and adnexa, VULVA: normal appearing vulva with no masses, tenderness or lesions, VAGINA: normal appearing vagina with normal color and discharge, no lesions, vaginal cuff well-healed    ASSESSMENT:   well woman    PLAN:   mammogram  pap smear  return annually or prn

## 2019-11-12 LAB — HPV I/H RISK 4 DNA CVX QL PROBE+SIG AMP: NEGATIVE

## 2019-11-20 ENCOUNTER — TELEPHONE (OUTPATIENT)
Dept: OBSTETRICS AND GYNECOLOGY | Facility: CLINIC | Age: 54
End: 2019-11-20

## 2019-11-20 NOTE — TELEPHONE ENCOUNTER
I called the patient to let her know that her pap smear and hpv results were negative. Patient verbalized understanding.

## 2019-11-21 ENCOUNTER — OFFICE VISIT (OUTPATIENT)
Dept: FAMILY MEDICINE CLINIC | Facility: CLINIC | Age: 54
End: 2019-11-21

## 2019-11-21 VITALS
HEART RATE: 72 BPM | TEMPERATURE: 100 F | OXYGEN SATURATION: 98 % | SYSTOLIC BLOOD PRESSURE: 130 MMHG | WEIGHT: 199.56 LBS | HEIGHT: 64 IN | DIASTOLIC BLOOD PRESSURE: 78 MMHG | BODY MASS INDEX: 34.07 KG/M2

## 2019-11-21 DIAGNOSIS — J40 BRONCHITIS: Primary | ICD-10-CM

## 2019-11-21 DIAGNOSIS — J06.9 UPPER RESPIRATORY TRACT INFECTION, UNSPECIFIED TYPE: ICD-10-CM

## 2019-11-21 DIAGNOSIS — R50.9 FEVER, UNSPECIFIED FEVER CAUSE: ICD-10-CM

## 2019-11-21 PROCEDURE — 99213 OFFICE O/P EST LOW 20 MIN: CPT | Performed by: FAMILY MEDICINE

## 2019-11-21 PROCEDURE — 87804 INFLUENZA ASSAY W/OPTIC: CPT | Performed by: FAMILY MEDICINE

## 2019-11-21 RX ORDER — AZITHROMYCIN 250 MG/1
TABLET, FILM COATED ORAL
Qty: 6 TABLET | Refills: 0 | Status: SHIPPED | OUTPATIENT
Start: 2019-11-21 | End: 2019-12-09

## 2019-11-21 RX ORDER — BROMPHENIRAMINE MALEATE, PSEUDOEPHEDRINE HYDROCHLORIDE, AND DEXTROMETHORPHAN HYDROBROMIDE 2; 30; 10 MG/5ML; MG/5ML; MG/5ML
5 SYRUP ORAL 4 TIMES DAILY PRN
Qty: 118 ML | Refills: 1 | Status: SHIPPED | OUTPATIENT
Start: 2019-11-21 | End: 2019-12-09

## 2019-11-21 NOTE — PROGRESS NOTES
"Subjective   Jefferson Pena is a 54 y.o. female.   Cc: cough and congestion  HPI  The patient comes in with a history of fever,myalgias and cough. This has been going on for a couple of days.   The following portions of the patient's history were reviewed and updated as appropriate: allergies, current medications, past family history, past medical history, past social history, past surgical history and problem list.    Review of Systems   Constitutional: Positive for fever. Negative for fatigue.   HENT: Positive for congestion.    Respiratory: Positive for cough. Negative for chest tightness, shortness of breath and stridor.    Cardiovascular: Negative for chest pain, palpitations and leg swelling.       Objective   Physical Exam   Constitutional: She is oriented to person, place, and time. She appears well-developed and well-nourished.   HENT:   Head: Normocephalic and atraumatic.   Right Ear: External ear normal.   Left Ear: External ear normal.   Mouth/Throat: Oropharynx is clear and moist.   Nose is congested.   Eyes: Conjunctivae are normal.   Neck: Normal range of motion.   Cardiovascular: Normal rate, regular rhythm and normal heart sounds. Exam reveals no gallop and no friction rub.   No murmur heard.  Pulmonary/Chest: Effort normal and breath sounds normal. No stridor. No respiratory distress. She has no wheezes. She has no rales.   Abdominal: Soft. Bowel sounds are normal. She exhibits no distension. There is no tenderness. There is no guarding.   Neurological: She is alert and oriented to person, place, and time.   Skin: Skin is warm and dry.   Vitals reviewed.        Visit Vitals  /78   Pulse 72   Temp 100 °F (37.8 °C) (Tympanic)   Ht 162.6 cm (64\")   Wt 90.5 kg (199 lb 9 oz)   LMP 07/06/2004 (Within Months)   SpO2 98%   Breastfeeding? No   BMI 34.25 kg/m²     Body mass index is 34.25 kg/m².      Assessment/Plan   Jefferson was seen today for illness, vomiting and diarrhea.    Diagnoses and all orders " for this visit:    Bronchitis    Fever, unspecified fever cause  -     POCT Influenza A/B    Upper respiratory tract infection, unspecified type    Other orders  -     azithromycin (ZITHROMAX) 250 MG tablet; Take 2 tablets the first day, then 1 tablet daily for 4 days.  -     brompheniramine-pseudoephedrine-DM 30-2-10 MG/5ML syrup; Take 5 mL by mouth 4 (Four) Times a Day As Needed for Allergies.    Flu screen was negative.  Return as needed.

## 2019-12-09 ENCOUNTER — OFFICE VISIT (OUTPATIENT)
Dept: FAMILY MEDICINE CLINIC | Facility: CLINIC | Age: 54
End: 2019-12-09

## 2019-12-09 VITALS
HEIGHT: 64 IN | OXYGEN SATURATION: 98 % | HEART RATE: 76 BPM | WEIGHT: 191.5 LBS | SYSTOLIC BLOOD PRESSURE: 134 MMHG | BODY MASS INDEX: 32.69 KG/M2 | DIASTOLIC BLOOD PRESSURE: 80 MMHG

## 2019-12-09 DIAGNOSIS — B00.1 FEVER BLISTER: Primary | ICD-10-CM

## 2019-12-09 DIAGNOSIS — J06.9 UPPER RESPIRATORY TRACT INFECTION, UNSPECIFIED TYPE: ICD-10-CM

## 2019-12-09 PROCEDURE — 99214 OFFICE O/P EST MOD 30 MIN: CPT | Performed by: FAMILY MEDICINE

## 2019-12-09 RX ORDER — LORATADINE 10 MG/1
10 TABLET ORAL DAILY
Qty: 15 TABLET | Refills: 1 | Status: SHIPPED | OUTPATIENT
Start: 2019-12-09 | End: 2020-12-09 | Stop reason: SDUPTHER

## 2019-12-09 RX ORDER — ACYCLOVIR 400 MG/1
400 TABLET ORAL DAILY
Qty: 7 TABLET | Refills: 0 | Status: SHIPPED | OUTPATIENT
Start: 2019-12-09 | End: 2020-06-04

## 2019-12-09 NOTE — PROGRESS NOTES
"Subjective   Jefferson Pena is a 54 y.o. female.    cc: follow up  Of URI   HPI The patient is feeling better. She has a fever blister on her lips.    The following portions of the patient's history were reviewed and updated as appropriate: allergies, current medications, past family history, past medical history, past social history, past surgical history and problem list.    Review of Systems   Constitutional: Negative for fatigue and fever.   Respiratory: Negative for cough, chest tightness and stridor.    Cardiovascular: Negative for chest pain, palpitations and leg swelling.       Objective   Physical Exam   Constitutional: She appears well-developed and well-nourished.   HENT:   Head: Normocephalic.   Right Ear: External ear normal.   Left Ear: External ear normal.   Nose: Nose normal.   She has a blister on her lip.   Eyes: Conjunctivae are normal.   Neck: Normal range of motion.   Cardiovascular: Normal rate and regular rhythm. Exam reveals no gallop and no friction rub.   No murmur heard.  Pulmonary/Chest: Effort normal and breath sounds normal. No stridor. No respiratory distress. She has no wheezes.   Abdominal: Soft. Bowel sounds are normal. She exhibits no distension. There is no tenderness. There is no guarding.   Skin: Skin is warm and dry.   Vitals reviewed.        Visit Vitals  /80   Pulse 76   Ht 162.6 cm (64\")   Wt 86.9 kg (191 lb 8 oz)   LMP 07/06/2004 (Within Months)   SpO2 98%   Breastfeeding No   BMI 32.87 kg/m²     Body mass index is 32.87 kg/m².      Assessment/Plan   Jefferson was seen today for follow-up.    Diagnoses and all orders for this visit:    Fever blister    Upper respiratory tract infection, unspecified type    Other orders  -     acyclovir (ZOVIRAX) 400 MG tablet; Take 1 tablet by mouth Daily. Take no more than 5 doses a day.  -     loratadine (CLARITIN) 10 MG tablet; Take 1 tablet by mouth Daily.    Return to the clinic in 6 month/s.  Will contact with results as " needed.

## 2020-01-20 ENCOUNTER — OFFICE VISIT (OUTPATIENT)
Dept: FAMILY MEDICINE CLINIC | Facility: CLINIC | Age: 55
End: 2020-01-20

## 2020-01-20 ENCOUNTER — LAB (OUTPATIENT)
Dept: LAB | Facility: HOSPITAL | Age: 55
End: 2020-01-20

## 2020-01-20 VITALS
SYSTOLIC BLOOD PRESSURE: 141 MMHG | BODY MASS INDEX: 32.98 KG/M2 | WEIGHT: 193.2 LBS | OXYGEN SATURATION: 98 % | HEIGHT: 64 IN | DIASTOLIC BLOOD PRESSURE: 90 MMHG | HEART RATE: 74 BPM | TEMPERATURE: 97.9 F

## 2020-01-20 DIAGNOSIS — I83.91 VARICOSE VEINS OF RIGHT LOWER EXTREMITY, UNSPECIFIED WHETHER COMPLICATED: ICD-10-CM

## 2020-01-20 DIAGNOSIS — M79.604 RIGHT LEG PAIN: Primary | ICD-10-CM

## 2020-01-20 DIAGNOSIS — M79.604 RIGHT LEG PAIN: ICD-10-CM

## 2020-01-20 PROCEDURE — 85025 COMPLETE CBC W/AUTO DIFF WBC: CPT

## 2020-01-20 PROCEDURE — 80053 COMPREHEN METABOLIC PANEL: CPT

## 2020-01-20 PROCEDURE — 99214 OFFICE O/P EST MOD 30 MIN: CPT | Performed by: FAMILY MEDICINE

## 2020-01-20 PROCEDURE — 96372 THER/PROPH/DIAG INJ SC/IM: CPT | Performed by: FAMILY MEDICINE

## 2020-01-20 PROCEDURE — 36415 COLL VENOUS BLD VENIPUNCTURE: CPT

## 2020-01-20 RX ORDER — TRIAMCINOLONE ACETONIDE 40 MG/ML
40 INJECTION, SUSPENSION INTRA-ARTICULAR; INTRAMUSCULAR ONCE
Status: COMPLETED | OUTPATIENT
Start: 2020-01-20 | End: 2020-01-20

## 2020-01-20 RX ADMIN — TRIAMCINOLONE ACETONIDE 40 MG: 40 INJECTION, SUSPENSION INTRA-ARTICULAR; INTRAMUSCULAR at 15:57

## 2020-01-21 LAB
ALBUMIN SERPL-MCNC: 4.8 G/DL (ref 3.5–5.2)
ALBUMIN/GLOB SERPL: 1.4 G/DL
ALP SERPL-CCNC: 108 U/L (ref 39–117)
ALT SERPL W P-5'-P-CCNC: 11 U/L (ref 1–33)
ANION GAP SERPL CALCULATED.3IONS-SCNC: 16.1 MMOL/L (ref 5–15)
AST SERPL-CCNC: 23 U/L (ref 1–32)
BASOPHILS # BLD AUTO: 0.04 10*3/MM3 (ref 0–0.2)
BASOPHILS NFR BLD AUTO: 0.4 % (ref 0–1.5)
BILIRUB SERPL-MCNC: 0.3 MG/DL (ref 0.2–1.2)
BUN BLD-MCNC: 20 MG/DL (ref 6–20)
BUN/CREAT SERPL: 27.4 (ref 7–25)
CALCIUM SPEC-SCNC: 9.5 MG/DL (ref 8.6–10.5)
CHLORIDE SERPL-SCNC: 101 MMOL/L (ref 98–107)
CO2 SERPL-SCNC: 23.9 MMOL/L (ref 22–29)
CREAT BLD-MCNC: 0.73 MG/DL (ref 0.57–1)
DEPRECATED RDW RBC AUTO: 49.2 FL (ref 37–54)
EOSINOPHIL # BLD AUTO: 0.26 10*3/MM3 (ref 0–0.4)
EOSINOPHIL NFR BLD AUTO: 2.4 % (ref 0.3–6.2)
ERYTHROCYTE [DISTWIDTH] IN BLOOD BY AUTOMATED COUNT: 14.7 % (ref 12.3–15.4)
GFR SERPL CREATININE-BSD FRML MDRD: 83 ML/MIN/1.73
GLOBULIN UR ELPH-MCNC: 3.5 GM/DL
GLUCOSE BLD-MCNC: 90 MG/DL (ref 65–99)
HCT VFR BLD AUTO: 37.8 % (ref 34–46.6)
HGB BLD-MCNC: 12.7 G/DL (ref 12–15.9)
IMM GRANULOCYTES # BLD AUTO: 0.03 10*3/MM3 (ref 0–0.05)
IMM GRANULOCYTES NFR BLD AUTO: 0.3 % (ref 0–0.5)
LYMPHOCYTES # BLD AUTO: 2.49 10*3/MM3 (ref 0.7–3.1)
LYMPHOCYTES NFR BLD AUTO: 22.6 % (ref 19.6–45.3)
MCH RBC QN AUTO: 31 PG (ref 26.6–33)
MCHC RBC AUTO-ENTMCNC: 33.6 G/DL (ref 31.5–35.7)
MCV RBC AUTO: 92.2 FL (ref 79–97)
MONOCYTES # BLD AUTO: 0.74 10*3/MM3 (ref 0.1–0.9)
MONOCYTES NFR BLD AUTO: 6.7 % (ref 5–12)
NEUTROPHILS # BLD AUTO: 7.47 10*3/MM3 (ref 1.7–7)
NEUTROPHILS NFR BLD AUTO: 67.6 % (ref 42.7–76)
NRBC BLD AUTO-RTO: 0 /100 WBC (ref 0–0.2)
PLATELET # BLD AUTO: 198 10*3/MM3 (ref 140–450)
PMV BLD AUTO: 12.5 FL (ref 6–12)
POTASSIUM BLD-SCNC: 4.4 MMOL/L (ref 3.5–5.2)
PROT SERPL-MCNC: 8.3 G/DL (ref 6–8.5)
RBC # BLD AUTO: 4.1 10*6/MM3 (ref 3.77–5.28)
SODIUM BLD-SCNC: 141 MMOL/L (ref 136–145)
WBC NRBC COR # BLD: 11.03 10*3/MM3 (ref 3.4–10.8)

## 2020-02-03 ENCOUNTER — OFFICE VISIT (OUTPATIENT)
Dept: FAMILY MEDICINE CLINIC | Facility: CLINIC | Age: 55
End: 2020-02-03

## 2020-02-03 VITALS
SYSTOLIC BLOOD PRESSURE: 136 MMHG | HEIGHT: 64 IN | DIASTOLIC BLOOD PRESSURE: 80 MMHG | OXYGEN SATURATION: 96 % | HEART RATE: 66 BPM | BODY MASS INDEX: 33.12 KG/M2 | WEIGHT: 194 LBS

## 2020-02-03 DIAGNOSIS — M25.561 CHRONIC PAIN OF RIGHT KNEE: ICD-10-CM

## 2020-02-03 DIAGNOSIS — O22.00 VARICOSE VEINS DURING PREGNANCY, ANTEPARTUM: Primary | ICD-10-CM

## 2020-02-03 DIAGNOSIS — G89.29 CHRONIC PAIN OF RIGHT KNEE: ICD-10-CM

## 2020-02-03 PROCEDURE — 99214 OFFICE O/P EST MOD 30 MIN: CPT | Performed by: FAMILY MEDICINE

## 2020-02-03 RX ORDER — NAPROXEN 250 MG/1
250 TABLET ORAL 2 TIMES DAILY PRN
Qty: 60 TABLET | Refills: 2 | Status: SHIPPED | OUTPATIENT
Start: 2020-02-03 | End: 2020-04-28 | Stop reason: DRUGHIGH

## 2020-02-03 NOTE — PROGRESS NOTES
"Subjective   Jefferson Pena is a 54 y.o. female.    cc: follow up  HPI The patient comes in for follow up of Varicosities of her legs and right knee pain. It can be worse if she is up on it more than usual.    The following portions of the patient's history were reviewed and updated as appropriate: allergies, current medications, past family history, past medical history, past social history, past surgical history and problem list.    Review of Systems   Constitutional: Negative for fatigue.   Respiratory: Negative for cough, chest tightness and stridor.    Cardiovascular: Negative for chest pain, palpitations and leg swelling.   Musculoskeletal: Positive for arthralgias.       Objective   Physical Exam   Constitutional: She appears well-developed and well-nourished.   HENT:   Head: Normocephalic and atraumatic.   Right Ear: External ear normal.   Left Ear: External ear normal.   Nose: Nose normal.   Eyes: Conjunctivae are normal.   Neck: Normal range of motion.   Cardiovascular: Normal rate, regular rhythm and normal heart sounds. Exam reveals no gallop and no friction rub.   No murmur heard.  Pulmonary/Chest: Effort normal and breath sounds normal. No stridor. No respiratory distress. She has no wheezes. She has no rales.   Abdominal: Soft. Bowel sounds are normal.   Musculoskeletal:   The right knee is tender to palpation. She has varicosities on both lower extremities.   Vitals reviewed.        Visit Vitals  /80   Pulse 66   Ht 162.6 cm (64\")   Wt 88 kg (194 lb)   LMP 07/06/2004 (Within Months)   SpO2 96%   Breastfeeding No   BMI 33.30 kg/m²     Body mass index is 33.3 kg/m².      Assessment/Plan   Jefferson was seen today for follow-up.    Diagnoses and all orders for this visit:    Varicose veins during pregnancy, antepartum    Chronic pain of right knee    Other orders  -     naproxen (NAPROSYN) 250 MG tablet; Take 1 tablet by mouth 2 (Two) Times a Day As Needed (pain).    Return to the clinic in 2 " month/s.  Will contact with results as needed.

## 2020-03-02 ENCOUNTER — OFFICE VISIT (OUTPATIENT)
Dept: CARDIAC SURGERY | Facility: CLINIC | Age: 55
End: 2020-03-02

## 2020-03-02 VITALS
TEMPERATURE: 98.1 F | HEART RATE: 66 BPM | SYSTOLIC BLOOD PRESSURE: 130 MMHG | OXYGEN SATURATION: 99 % | WEIGHT: 195 LBS | DIASTOLIC BLOOD PRESSURE: 81 MMHG | BODY MASS INDEX: 33.29 KG/M2 | HEIGHT: 64 IN

## 2020-03-02 DIAGNOSIS — I10 BENIGN ESSENTIAL HTN: ICD-10-CM

## 2020-03-02 DIAGNOSIS — E66.09 CLASS 1 OBESITY DUE TO EXCESS CALORIES WITH SERIOUS COMORBIDITY AND BODY MASS INDEX (BMI) OF 33.0 TO 33.9 IN ADULT: ICD-10-CM

## 2020-03-02 DIAGNOSIS — I83.813 VARICOSE VEINS OF BOTH LOWER EXTREMITIES WITH PAIN: Primary | ICD-10-CM

## 2020-03-02 DIAGNOSIS — I87.2 VENOUS INSUFFICIENCY (CHRONIC) (PERIPHERAL): ICD-10-CM

## 2020-03-02 PROCEDURE — 99205 OFFICE O/P NEW HI 60 MIN: CPT | Performed by: THORACIC SURGERY (CARDIOTHORACIC VASCULAR SURGERY)

## 2020-03-04 LAB
BH CV LOWER VASCULAR LEFT COMMON FEMORAL AUGMENT: NORMAL
BH CV LOWER VASCULAR LEFT COMMON FEMORAL COMPETENT: NORMAL
BH CV LOWER VASCULAR LEFT COMMON FEMORAL COMPRESS: NORMAL
BH CV LOWER VASCULAR LEFT COMMON FEMORAL PHASIC: NORMAL
BH CV LOWER VASCULAR LEFT COMMON FEMORAL SPONT: NORMAL
BH CV LOWER VASCULAR LEFT DISTAL FEMORAL AUGMENT: NORMAL
BH CV LOWER VASCULAR LEFT DISTAL FEMORAL COMPETENT: NORMAL
BH CV LOWER VASCULAR LEFT DISTAL FEMORAL COMPRESS: NORMAL
BH CV LOWER VASCULAR LEFT DISTAL FEMORAL PHASIC: NORMAL
BH CV LOWER VASCULAR LEFT DISTAL FEMORAL SPONT: NORMAL
BH CV LOWER VASCULAR LEFT GASTRONEMIUS COMPRESS: NORMAL
BH CV LOWER VASCULAR LEFT GREATER SAPH AK AUGMENT: NORMAL
BH CV LOWER VASCULAR LEFT GREATER SAPH AK COMPETENT: NORMAL
BH CV LOWER VASCULAR LEFT GREATER SAPH AK COMPRESS: NORMAL
BH CV LOWER VASCULAR LEFT GREATER SAPH AK PHASIC: NORMAL
BH CV LOWER VASCULAR LEFT GREATER SAPH AK SPONT: NORMAL
BH CV LOWER VASCULAR LEFT GREATER SAPH BK AUGMENT: NORMAL
BH CV LOWER VASCULAR LEFT GREATER SAPH BK COMPETENT: NORMAL
BH CV LOWER VASCULAR LEFT GREATER SAPH BK COMPRESS: NORMAL
BH CV LOWER VASCULAR LEFT LESSER SAPH COMPRESS: NORMAL
BH CV LOWER VASCULAR LEFT MID FEMORAL AUGMENT: NORMAL
BH CV LOWER VASCULAR LEFT MID FEMORAL COMPETENT: NORMAL
BH CV LOWER VASCULAR LEFT MID FEMORAL COMPRESS: NORMAL
BH CV LOWER VASCULAR LEFT MID FEMORAL PHASIC: NORMAL
BH CV LOWER VASCULAR LEFT MID FEMORAL SPONT: NORMAL
BH CV LOWER VASCULAR LEFT PERONEAL AUGMENT: NORMAL
BH CV LOWER VASCULAR LEFT PERONEAL COMPETENT: NORMAL
BH CV LOWER VASCULAR LEFT PERONEAL COMPRESS: NORMAL
BH CV LOWER VASCULAR LEFT POPLITEAL AUGMENT: NORMAL
BH CV LOWER VASCULAR LEFT POPLITEAL COMPETENT: NORMAL
BH CV LOWER VASCULAR LEFT POPLITEAL COMPRESS: NORMAL
BH CV LOWER VASCULAR LEFT POPLITEAL PHASIC: NORMAL
BH CV LOWER VASCULAR LEFT POPLITEAL SPONT: NORMAL
BH CV LOWER VASCULAR LEFT POSTERIOR TIBIAL AUGMENT: NORMAL
BH CV LOWER VASCULAR LEFT POSTERIOR TIBIAL COMPETENT: NORMAL
BH CV LOWER VASCULAR LEFT POSTERIOR TIBIAL COMPRESS: NORMAL
BH CV LOWER VASCULAR LEFT PROFUNDA FEMORAL AUGMENT: NORMAL
BH CV LOWER VASCULAR LEFT PROFUNDA FEMORAL COMPETENT: NORMAL
BH CV LOWER VASCULAR LEFT PROFUNDA FEMORAL COMPRESS: NORMAL
BH CV LOWER VASCULAR LEFT PROFUNDA FEMORAL PHASIC: NORMAL
BH CV LOWER VASCULAR LEFT PROFUNDA FEMORAL SPONT: NORMAL
BH CV LOWER VASCULAR LEFT PROXIMAL FEMORAL AUGMENT: NORMAL
BH CV LOWER VASCULAR LEFT PROXIMAL FEMORAL COMPETENT: NORMAL
BH CV LOWER VASCULAR LEFT PROXIMAL FEMORAL COMPRESS: NORMAL
BH CV LOWER VASCULAR LEFT PROXIMAL FEMORAL PHASIC: NORMAL
BH CV LOWER VASCULAR LEFT PROXIMAL FEMORAL SPONT: NORMAL
BH CV LOWER VASCULAR LEFT SAPHENOFEMORAL JUNCTION AUGMENT: NORMAL
BH CV LOWER VASCULAR LEFT SAPHENOFEMORAL JUNCTION COMPETENT: NORMAL
BH CV LOWER VASCULAR LEFT SAPHENOFEMORAL JUNCTION COMPRESS: NORMAL
BH CV LOWER VASCULAR LEFT SAPHENOFEMORAL JUNCTION PHASIC: NORMAL
BH CV LOWER VASCULAR LEFT SAPHENOFEMORAL JUNCTION SPONT: NORMAL
BH CV LOWER VASCULAR LEFT VARICOSITY AK AUGMENT: NORMAL
BH CV LOWER VASCULAR LEFT VARICOSITY AK COMPETENT: NORMAL
BH CV LOWER VASCULAR LEFT VARICOSITY AK COMPRESS: NORMAL
BH CV LOWER VASCULAR RIGHT COMMON FEMORAL AUGMENT: NORMAL
BH CV LOWER VASCULAR RIGHT COMMON FEMORAL COMPETENT: NORMAL
BH CV LOWER VASCULAR RIGHT COMMON FEMORAL COMPRESS: NORMAL
BH CV LOWER VASCULAR RIGHT COMMON FEMORAL PHASIC: NORMAL
BH CV LOWER VASCULAR RIGHT COMMON FEMORAL SPONT: NORMAL
BH CV LOWER VASCULAR RIGHT DISTAL FEMORAL AUGMENT: NORMAL
BH CV LOWER VASCULAR RIGHT DISTAL FEMORAL COMPETENT: NORMAL
BH CV LOWER VASCULAR RIGHT DISTAL FEMORAL COMPRESS: NORMAL
BH CV LOWER VASCULAR RIGHT DISTAL FEMORAL PHASIC: NORMAL
BH CV LOWER VASCULAR RIGHT DISTAL FEMORAL SPONT: NORMAL
BH CV LOWER VASCULAR RIGHT GASTRONEMIUS COMPRESS: NORMAL
BH CV LOWER VASCULAR RIGHT GREATER SAPH AK AUGMENT: NORMAL
BH CV LOWER VASCULAR RIGHT GREATER SAPH AK COMPETENT: NORMAL
BH CV LOWER VASCULAR RIGHT GREATER SAPH AK COMPRESS: NORMAL
BH CV LOWER VASCULAR RIGHT GREATER SAPH AK PHASIC: NORMAL
BH CV LOWER VASCULAR RIGHT GREATER SAPH AK SPONT: NORMAL
BH CV LOWER VASCULAR RIGHT GREATER SAPH BK AUGMENT: NORMAL
BH CV LOWER VASCULAR RIGHT GREATER SAPH BK COMPETENT: NORMAL
BH CV LOWER VASCULAR RIGHT GREATER SAPH BK COMPRESS: NORMAL
BH CV LOWER VASCULAR RIGHT LESSER SAPH AUGMENT: NORMAL
BH CV LOWER VASCULAR RIGHT LESSER SAPH COMPETENT: NORMAL
BH CV LOWER VASCULAR RIGHT LESSER SAPH COMPRESS: NORMAL
BH CV LOWER VASCULAR RIGHT MID FEMORAL AUGMENT: NORMAL
BH CV LOWER VASCULAR RIGHT MID FEMORAL COMPETENT: NORMAL
BH CV LOWER VASCULAR RIGHT MID FEMORAL COMPRESS: NORMAL
BH CV LOWER VASCULAR RIGHT MID FEMORAL PHASIC: NORMAL
BH CV LOWER VASCULAR RIGHT MID FEMORAL SPONT: NORMAL
BH CV LOWER VASCULAR RIGHT PERONEAL AUGMENT: NORMAL
BH CV LOWER VASCULAR RIGHT PERONEAL COMPETENT: NORMAL
BH CV LOWER VASCULAR RIGHT PERONEAL COMPRESS: NORMAL
BH CV LOWER VASCULAR RIGHT POPLITEAL AUGMENT: NORMAL
BH CV LOWER VASCULAR RIGHT POPLITEAL COMPETENT: NORMAL
BH CV LOWER VASCULAR RIGHT POPLITEAL COMPRESS: NORMAL
BH CV LOWER VASCULAR RIGHT POPLITEAL PHASIC: NORMAL
BH CV LOWER VASCULAR RIGHT POPLITEAL SPONT: NORMAL
BH CV LOWER VASCULAR RIGHT POSTERIOR TIBIAL AUGMENT: NORMAL
BH CV LOWER VASCULAR RIGHT POSTERIOR TIBIAL COMPETENT: NORMAL
BH CV LOWER VASCULAR RIGHT POSTERIOR TIBIAL COMPRESS: NORMAL
BH CV LOWER VASCULAR RIGHT PROFUNDA FEMORAL AUGMENT: NORMAL
BH CV LOWER VASCULAR RIGHT PROFUNDA FEMORAL COMPETENT: NORMAL
BH CV LOWER VASCULAR RIGHT PROFUNDA FEMORAL COMPRESS: NORMAL
BH CV LOWER VASCULAR RIGHT PROFUNDA FEMORAL PHASIC: NORMAL
BH CV LOWER VASCULAR RIGHT PROFUNDA FEMORAL SPONT: NORMAL
BH CV LOWER VASCULAR RIGHT PROXIMAL FEMORAL AUGMENT: NORMAL
BH CV LOWER VASCULAR RIGHT PROXIMAL FEMORAL COMPETENT: NORMAL
BH CV LOWER VASCULAR RIGHT PROXIMAL FEMORAL COMPRESS: NORMAL
BH CV LOWER VASCULAR RIGHT PROXIMAL FEMORAL PHASIC: NORMAL
BH CV LOWER VASCULAR RIGHT PROXIMAL FEMORAL SPONT: NORMAL
BH CV LOWER VASCULAR RIGHT SAPHENOFEMORAL JUNCTION AUGMENT: NORMAL
BH CV LOWER VASCULAR RIGHT SAPHENOFEMORAL JUNCTION COMPETENT: NORMAL
BH CV LOWER VASCULAR RIGHT SAPHENOFEMORAL JUNCTION COMPRESS: NORMAL
BH CV LOWER VASCULAR RIGHT SAPHENOFEMORAL JUNCTION PHASIC: NORMAL
BH CV LOWER VASCULAR RIGHT SAPHENOFEMORAL JUNCTION SPONT: NORMAL
BH CV LOWER VASCULAR RIGHT VARICOSITY AK COMPETENT: NORMAL
BH CV LOWER VASCULAR RIGHT VARICOSITY AK COMPRESS: NORMAL
BH CV LOWER VASCULAR RIGHT VARICOSITY BK COMPETENT: NORMAL
BH CV LOWER VASCULAR RIGHT VARICOSITY BK COMPRESS: NORMAL

## 2020-03-08 PROBLEM — I83.813 VARICOSE VEINS OF BOTH LOWER EXTREMITIES WITH PAIN: Status: ACTIVE | Noted: 2020-03-08

## 2020-03-08 PROBLEM — I87.2 VENOUS INSUFFICIENCY (CHRONIC) (PERIPHERAL): Status: ACTIVE | Noted: 2020-03-08

## 2020-03-08 PROBLEM — E66.09 CLASS 1 OBESITY DUE TO EXCESS CALORIES WITH SERIOUS COMORBIDITY AND BODY MASS INDEX (BMI) OF 33.0 TO 33.9 IN ADULT: Status: ACTIVE | Noted: 2020-03-08

## 2020-03-08 PROBLEM — I10 BENIGN ESSENTIAL HTN: Status: ACTIVE | Noted: 2020-03-08

## 2020-03-08 RX ORDER — CLINDAMYCIN PHOSPHATE 900 MG/50ML
900 INJECTION INTRAVENOUS ONCE
Status: CANCELLED | OUTPATIENT
Start: 2020-06-05 | End: 2020-03-08

## 2020-03-08 RX ORDER — SODIUM CHLORIDE 9 MG/ML
100 INJECTION, SOLUTION INTRAVENOUS CONTINUOUS
Status: CANCELLED | OUTPATIENT
Start: 2020-06-05

## 2020-03-09 NOTE — PROGRESS NOTES
3/2/2020    Aníbalia Nadja Pena  1965    Chief Complaint:    Chief Complaint   Patient presents with   • Varicose Veins       HPI:      PCP:  Andrew Perez MD    55 y.o. female with HTN(stable, increased risk stroke, rupture) and Obesity(uncontrolled, increased risk cardiovascular events) , varicose veins(new, increase risk venous stasis), venous insufficiency(new, increase risk venous stasis).  never smoked.  Severe varicose veins with pain  x years. Has worn compression stockings without relief.  Painful to wear.  No TIA stroke amaurosis.  No MI claudication. No other associated signs, symptoms or modifying factors.    3/2016 LEFT stab phlebectomies.  3/2020 Lower extremity venous duplex:  No dvt.  RIGHT GSV short segment proximal reflux, large varicosities with reflux.  Duplicated femoral vein, large varicosity.  LEFT GSV duplicated short segment proximal reflux, large varicosities with reflux.  Bifurcated popliteal.    The following portions of the patient's history were reviewed and updated as appropriate: allergies, current medications, past family history, past medical history, past social history, past surgical history and problem list.  Recent images independently reviewed.  Available laboratory values reviewed.    PMH:  Past Medical History:   Diagnosis Date   • Allergic rhinitis    • Asymptomatic varicose veins of bilateral lower extremities    • Cellulitis    • Cough    • Elevated blood pressure reading without diagnosis of hypertension    • Encounter for surgical aftercare following surgery on the circulatory system     LLE Stab Phlebectomies (24) 03/25/16      • H/O mammogram 07/14/2005    may represent a small cysy or a fibroadenoma,asymmetric density represent normal parenchyma   • Hordeolum externum right lower eyelid    • Obesity    • Other mucopurulent conjunctivitis, right eye    • Pain in left leg    • Peripheral venous insufficiency    • Upper respiratory infection    • Varicose veins of  left lower leg with ulcer and inflammation (CMS/HCC)    • Venous insufficiency     chronic) (peripheral) - Wears Compression stockings        Past Surgical History:   Procedure Laterality Date   • COLOSTOMY     • HYSTERECTOMY     • PAP SMEAR  06/03/2004    normal   • SKIN BIOPSY     • VASCULAR SURGERY  02/02/2016   • VEIN SURGERY  03/25/2016    Left lower extremity stab phlebectomies   • WRIST SURGERY  06/02/1982    excision, ganglion cyst,dorsum of the right      Family History   Problem Relation Age of Onset   • Hypertension Mother    • Hypertension Father    • Breast cancer Maternal Grandmother    • Stroke Other    • Hyperlipidemia Other    • Heart disease Other    • Thyroid disease Other      Social History     Tobacco Use   • Smoking status: Never Smoker   • Smokeless tobacco: Never Used   Substance Use Topics   • Alcohol use: No   • Drug use: No       ALLERGIES:  Allergies   Allergen Reactions   • Sulfa Antibiotics Unknown - Low Severity     Patient is unsure   • Penicillins Rash         MEDICATIONS:    Current Outpatient Medications:   •  acyclovir (ZOVIRAX) 400 MG tablet, Take 1 tablet by mouth Daily., Disp: 7 tablet, Rfl: 0  •  loratadine (CLARITIN) 10 MG tablet, Take 1 tablet by mouth Daily., Disp: 15 tablet, Rfl: 1  •  Multiple Vitamins-Minerals (MULTIVITAMIN WITH MINERALS) tablet tablet, Take 1 tablet by mouth Daily., Disp: , Rfl:   •  naproxen (NAPROSYN) 250 MG tablet, Take 1 tablet by mouth 2 (Two) Times a Day As Needed for Pain.  **Take with food**, Disp: 60 tablet, Rfl: 2  •  valACYclovir (VALTREX) 500 MG tablet, Take 1 tablet by mouth 2 (Two) Times a Day., Disp: 60 tablet, Rfl: 6    Review of Systems   Review of Systems   Constitution: Positive for malaise/fatigue. Negative for weight loss.   Cardiovascular: Negative for chest pain, claudication and dyspnea on exertion.        Pain with walking  Varicose veins.   Respiratory: Negative for cough and shortness of breath.    Skin: Negative for color  "change and poor wound healing.   Musculoskeletal: Positive for muscle weakness and myalgias.   Neurological: Negative for dizziness, numbness and weakness.       Physical Exam   Vitals:    03/02/20 1607 03/02/20 1637   BP: 132/80 130/81   BP Location: Left arm Right arm   Patient Position: Sitting Sitting   Cuff Size: Adult Adult   Pulse: 66    Temp: 98.1 °F (36.7 °C)    TempSrc: Temporal    SpO2: 99%    Weight: 88.5 kg (195 lb)    Height: 162.6 cm (64\")      Physical Exam   Constitutional: She is oriented to person, place, and time. She is active and cooperative. She does not appear ill. No distress.   HENT:   Right Ear: Hearing normal.   Left Ear: Hearing normal.   Nose: No nasal deformity. No epistaxis.   Mouth/Throat: She does not have dentures. Normal dentition.   Cardiovascular: Normal rate and regular rhythm.   No murmur heard.  Pulses:       Carotid pulses are 2+ on the right side, and 2+ on the left side.       Radial pulses are 2+ on the right side, and 2+ on the left side.        Dorsalis pedis pulses are 2+ on the right side, and 2+ on the left side.        Posterior tibial pulses are 2+ on the right side, and 2+ on the left side.   Severe varicose veins bilateral thigh leg.  Severe bilateraldiffuse spider telangelectasias.   Pulmonary/Chest: Effort normal and breath sounds normal.   Abdominal: Soft. She exhibits no distension and no mass. There is no tenderness.   Musculoskeletal: She exhibits no deformity.   Gait normal.    Neurological: She is alert and oriented to person, place, and time. She has normal strength.   Skin: Skin is warm and dry. No cyanosis or erythema. No pallor.   No venous staining   Psychiatric: She has a normal mood and affect. Her speech is normal. Judgment and thought content normal.       BUN   Date Value Ref Range Status   01/20/2020 20 6 - 20 mg/dL Final     Creatinine   Date Value Ref Range Status   01/20/2020 0.73 0.57 - 1.00 mg/dL Final     eGFR Non  Amer   Date " Value Ref Range Status   01/20/2020 83 >60 mL/min/1.73 Final       ASSESSMENT:  Jefferson was seen today for varicose veins.    Diagnoses and all orders for this visit:    Varicose veins of both lower extremities with pain  -     Duplex Venous Lower Extremity - Bilateral CAR  -     Case Request; Standing  -     Case Request    Benign essential HTN    Venous insufficiency (chronic) (peripheral)    Class 1 obesity due to excess calories with serious comorbidity and body mass index (BMI) of 33.0 to 33.9 in adult    Other orders  -     Provide NPO Instructions to Patient; Future  -     Chlorhexidine Skin Prep; Future    PLAN:  Detailed discussion with Jefferson Pena regarding situation and options.  worsening symptoms, pain and swelling related to severe varicose veins. prior stab phlebectomies. Reflux not amenable to ablation, vein stripping. conservative measures have been unsuccessful (weight reduction, daily exercise program, compression stockings, leg elevation)  Surgical intervention is advisable.    Risks:  bleeding, infection, transfusion, blood clots, additional procedures may be required.  Benefits:  relief of symptoms, improvement in lifestyle and ability to stand for work.  Options:  vein stripping, multiple stab phlebectomy.  Understands and wishes to proceed.    RIGHT lower extremity stab phlebectomies.  GEN.  SDS.  4/3/2020    Return after above studies complete  Obesity Class  1. Increased risk cardiovascular events, sleep and breathing disorders, joint issues, type 2 diabetes mellitus. Options for weight management, heart healthy diet, exercise programs, and associated health risks of obesity discussed.  Recommended regular physical activity, progressive walking program.  Continue current medications as directed.  Will Obtain relevant old records.    Thank you for the opportunity to participate in this patient's care.    Copy to primary care provider.    EMR Dragon/Transcription disclaimer:   Much of this  encounter note is an electronic transcription/translation of spoken language to printed text. The electronic translation of spoken language may permit erroneous, or at times, nonsensical words or phrases to be inadvertently transcribed; Although I have reviewed the note for such errors, some may still exist.

## 2020-03-30 ENCOUNTER — APPOINTMENT (OUTPATIENT)
Dept: PREADMISSION TESTING | Facility: HOSPITAL | Age: 55
End: 2020-03-30

## 2020-04-28 ENCOUNTER — OFFICE VISIT (OUTPATIENT)
Dept: FAMILY MEDICINE CLINIC | Facility: CLINIC | Age: 55
End: 2020-04-28

## 2020-04-28 VITALS
BODY MASS INDEX: 33.71 KG/M2 | HEART RATE: 67 BPM | HEIGHT: 64 IN | DIASTOLIC BLOOD PRESSURE: 80 MMHG | SYSTOLIC BLOOD PRESSURE: 144 MMHG | OXYGEN SATURATION: 100 % | WEIGHT: 197.44 LBS

## 2020-04-28 DIAGNOSIS — M79.671 PAIN IN BOTH FEET: ICD-10-CM

## 2020-04-28 DIAGNOSIS — M54.50 CHRONIC MIDLINE LOW BACK PAIN, UNSPECIFIED WHETHER SCIATICA PRESENT: Primary | ICD-10-CM

## 2020-04-28 DIAGNOSIS — G89.29 CHRONIC MIDLINE LOW BACK PAIN, UNSPECIFIED WHETHER SCIATICA PRESENT: Primary | ICD-10-CM

## 2020-04-28 DIAGNOSIS — M79.672 PAIN IN BOTH FEET: ICD-10-CM

## 2020-04-28 PROBLEM — M54.9 BACK PAIN: Status: ACTIVE | Noted: 2020-04-28

## 2020-04-28 PROCEDURE — 99214 OFFICE O/P EST MOD 30 MIN: CPT | Performed by: FAMILY MEDICINE

## 2020-04-28 RX ORDER — NAPROXEN 500 MG/1
500 TABLET ORAL 2 TIMES DAILY WITH MEALS
Qty: 60 TABLET | Refills: 0 | Status: SHIPPED | OUTPATIENT
Start: 2020-04-28 | End: 2020-12-09 | Stop reason: SDUPTHER

## 2020-04-28 NOTE — PROGRESS NOTES
Subjective   Jefferson Pena is a 55 y.o. female.   Cc;back pain  Back Pain   This is a chronic problem. The current episode started more than 1 year ago. The problem occurs intermittently. The problem is unchanged. The pain is present in the lumbar spine. Associated symptoms include leg pain. Pertinent negatives include no abdominal pain, bladder incontinence, bowel incontinence, chest pain, fever, numbness, paresthesias or tingling.   Leg Pain    The incident occurred more than 1 week ago. There was no injury mechanism. The pain is present in the left leg, left foot, left knee, right leg, right knee and right foot. The pain is at a severity of 6/10. Pertinent negatives include no numbness or tingling.     She has been working extra hours and she feels that is has caused her legs and feet  to hurt  The following portions of the patient's history were reviewed and updated as appropriate: allergies, current medications, past family history, past medical history, past social history, past surgical history and problem list.    Review of Systems   Constitutional: Negative for fatigue and fever.   Respiratory: Negative for cough, chest tightness and stridor.    Cardiovascular: Negative for chest pain, palpitations and leg swelling.   Gastrointestinal: Negative for abdominal pain and bowel incontinence.   Genitourinary: Negative for bladder incontinence.   Musculoskeletal: Positive for arthralgias, back pain and myalgias. Negative for joint swelling, neck pain and neck stiffness.   Neurological: Negative for tingling, numbness and paresthesias.       Objective   Physical Exam   Constitutional: She appears well-developed and well-nourished.   HENT:   Head: Normocephalic and atraumatic.   Eyes: Pupils are equal, round, and reactive to light. Conjunctivae are normal.   Neck: Normal range of motion.   Cardiovascular: Normal rate and regular rhythm. Exam reveals no gallop and no friction rub.   No murmur  "heard.  Pulmonary/Chest: Effort normal and breath sounds normal. No stridor. No respiratory distress. She has no wheezes.   Musculoskeletal:   There is tenderness in the Lumbar boom. The knees and ankles are non tender.   Neurological: She is alert.   Skin: Skin is warm and dry.   Vitals reviewed.        Visit Vitals  /80   Pulse 67   Ht 162.6 cm (64\")   Wt 89.6 kg (197 lb 7 oz)   LMP 07/06/2004 (Within Months)   SpO2 100%   Breastfeeding No   BMI 33.89 kg/m²     Body mass index is 33.89 kg/m².      Assessment/Plan   Jefferson was seen today for follow-up and leg pain.    Diagnoses and all orders for this visit:    Chronic midline low back pain, unspecified whether sciatica present    Pain in both feet    Other orders  -     naproxen (NAPROSYN) 500 MG tablet; Take 1 tablet by mouth 2 (Two) Times a Day With Meals.    She is to go to naproxen 500 mg bid. She is to stop her lower dose.  Return to the clinic in 3 month/s.  Will contact with results as needed.    "

## 2020-06-01 ENCOUNTER — APPOINTMENT (OUTPATIENT)
Dept: PREADMISSION TESTING | Facility: HOSPITAL | Age: 55
End: 2020-06-01

## 2020-06-02 PROCEDURE — U0003 INFECTIOUS AGENT DETECTION BY NUCLEIC ACID (DNA OR RNA); SEVERE ACUTE RESPIRATORY SYNDROME CORONAVIRUS 2 (SARS-COV-2) (CORONAVIRUS DISEASE [COVID-19]), AMPLIFIED PROBE TECHNIQUE, MAKING USE OF HIGH THROUGHPUT TECHNOLOGIES AS DESCRIBED BY CMS-2020-01-R: HCPCS | Performed by: THORACIC SURGERY (CARDIOTHORACIC VASCULAR SURGERY)

## 2020-06-03 LAB
COVID LABCORP PRIORITY: NORMAL
SARS-COV-2 RNA RESP QL NAA+PROBE: NOT DETECTED

## 2020-06-04 ENCOUNTER — ANESTHESIA EVENT (OUTPATIENT)
Dept: PERIOP | Facility: HOSPITAL | Age: 55
End: 2020-06-04

## 2020-06-05 ENCOUNTER — ANESTHESIA (OUTPATIENT)
Dept: PERIOP | Facility: HOSPITAL | Age: 55
End: 2020-06-05

## 2020-06-05 ENCOUNTER — HOSPITAL ENCOUNTER (OUTPATIENT)
Facility: HOSPITAL | Age: 55
Setting detail: HOSPITAL OUTPATIENT SURGERY
Discharge: HOME | End: 2020-06-05
Attending: THORACIC SURGERY (CARDIOTHORACIC VASCULAR SURGERY) | Admitting: THORACIC SURGERY (CARDIOTHORACIC VASCULAR SURGERY)

## 2020-06-05 VITALS
HEIGHT: 65 IN | WEIGHT: 194.89 LBS | TEMPERATURE: 98.3 F | DIASTOLIC BLOOD PRESSURE: 77 MMHG | OXYGEN SATURATION: 98 % | SYSTOLIC BLOOD PRESSURE: 175 MMHG | HEART RATE: 65 BPM | BODY MASS INDEX: 32.47 KG/M2 | RESPIRATION RATE: 20 BRPM

## 2020-06-05 DIAGNOSIS — I83.813 VARICOSE VEINS OF BOTH LOWER EXTREMITIES WITH PAIN: ICD-10-CM

## 2020-06-05 PROCEDURE — 25010000002 ONDANSETRON PER 1 MG: Performed by: NURSE ANESTHETIST, CERTIFIED REGISTERED

## 2020-06-05 PROCEDURE — S0260 H&P FOR SURGERY: HCPCS | Performed by: THORACIC SURGERY (CARDIOTHORACIC VASCULAR SURGERY)

## 2020-06-05 PROCEDURE — 37766 PHLEB VEINS - EXTREM 20+: CPT | Performed by: THORACIC SURGERY (CARDIOTHORACIC VASCULAR SURGERY)

## 2020-06-05 PROCEDURE — 25010000002 FENTANYL CITRATE (PF) 100 MCG/2ML SOLUTION: Performed by: NURSE ANESTHETIST, CERTIFIED REGISTERED

## 2020-06-05 PROCEDURE — 25010000002 PROPOFOL 10 MG/ML EMULSION: Performed by: NURSE ANESTHETIST, CERTIFIED REGISTERED

## 2020-06-05 PROCEDURE — 25010000002 MIDAZOLAM PER 1 MG: Performed by: NURSE ANESTHETIST, CERTIFIED REGISTERED

## 2020-06-05 RX ORDER — CLINDAMYCIN PHOSPHATE 900 MG/50ML
900 INJECTION INTRAVENOUS ONCE
Status: COMPLETED | OUTPATIENT
Start: 2020-06-05 | End: 2020-06-05

## 2020-06-05 RX ORDER — MIDAZOLAM HYDROCHLORIDE 1 MG/ML
INJECTION INTRAMUSCULAR; INTRAVENOUS AS NEEDED
Status: DISCONTINUED | OUTPATIENT
Start: 2020-06-05 | End: 2020-06-05 | Stop reason: SURG

## 2020-06-05 RX ORDER — ONDANSETRON 4 MG/1
4 TABLET, FILM COATED ORAL ONCE AS NEEDED
Status: DISCONTINUED | OUTPATIENT
Start: 2020-06-05 | End: 2020-06-05 | Stop reason: HOSPADM

## 2020-06-05 RX ORDER — PROPOFOL 10 MG/ML
VIAL (ML) INTRAVENOUS AS NEEDED
Status: DISCONTINUED | OUTPATIENT
Start: 2020-06-05 | End: 2020-06-05 | Stop reason: SURG

## 2020-06-05 RX ORDER — ONDANSETRON 2 MG/ML
INJECTION INTRAMUSCULAR; INTRAVENOUS AS NEEDED
Status: DISCONTINUED | OUTPATIENT
Start: 2020-06-05 | End: 2020-06-05 | Stop reason: SURG

## 2020-06-05 RX ORDER — ACETAMINOPHEN 325 MG/1
650 TABLET ORAL ONCE
Status: DISCONTINUED | OUTPATIENT
Start: 2020-06-05 | End: 2020-06-05 | Stop reason: HOSPADM

## 2020-06-05 RX ORDER — HYDROCODONE BITARTRATE AND ACETAMINOPHEN 5; 325 MG/1; MG/1
1 TABLET ORAL EVERY 4 HOURS PRN
Qty: 40 TABLET | Refills: 0 | Status: SHIPPED | OUTPATIENT
Start: 2020-06-05 | End: 2021-03-09

## 2020-06-05 RX ORDER — LIDOCAINE HYDROCHLORIDE 20 MG/ML
INJECTION, SOLUTION INFILTRATION; PERINEURAL AS NEEDED
Status: DISCONTINUED | OUTPATIENT
Start: 2020-06-05 | End: 2020-06-05 | Stop reason: SURG

## 2020-06-05 RX ORDER — HYDROCODONE BITARTRATE AND ACETAMINOPHEN 5; 325 MG/1; MG/1
1 TABLET ORAL ONCE AS NEEDED
Status: DISCONTINUED | OUTPATIENT
Start: 2020-06-05 | End: 2020-06-05 | Stop reason: HOSPADM

## 2020-06-05 RX ORDER — FENTANYL CITRATE 50 UG/ML
INJECTION, SOLUTION INTRAMUSCULAR; INTRAVENOUS AS NEEDED
Status: DISCONTINUED | OUTPATIENT
Start: 2020-06-05 | End: 2020-06-05 | Stop reason: SURG

## 2020-06-05 RX ORDER — SODIUM CHLORIDE 9 MG/ML
100 INJECTION, SOLUTION INTRAVENOUS CONTINUOUS
Status: DISCONTINUED | OUTPATIENT
Start: 2020-06-05 | End: 2020-06-05 | Stop reason: HOSPADM

## 2020-06-05 RX ADMIN — FENTANYL CITRATE 25 MCG: 50 INJECTION, SOLUTION INTRAMUSCULAR; INTRAVENOUS at 08:31

## 2020-06-05 RX ADMIN — PROPOFOL 120 MG: 10 INJECTION, EMULSION INTRAVENOUS at 08:23

## 2020-06-05 RX ADMIN — CLINDAMYCIN IN 5 PERCENT DEXTROSE 900 MG: 18 INJECTION, SOLUTION INTRAVENOUS at 08:27

## 2020-06-05 RX ADMIN — PROPOFOL 30 MG: 10 INJECTION, EMULSION INTRAVENOUS at 08:27

## 2020-06-05 RX ADMIN — ONDANSETRON 4 MG: 2 INJECTION INTRAMUSCULAR; INTRAVENOUS at 09:27

## 2020-06-05 RX ADMIN — FENTANYL CITRATE 25 MCG: 50 INJECTION, SOLUTION INTRAMUSCULAR; INTRAVENOUS at 09:30

## 2020-06-05 RX ADMIN — MIDAZOLAM HYDROCHLORIDE 2 MG: 2 INJECTION, SOLUTION INTRAMUSCULAR; INTRAVENOUS at 08:19

## 2020-06-05 RX ADMIN — PROPOFOL 30 MG: 10 INJECTION, EMULSION INTRAVENOUS at 08:29

## 2020-06-05 RX ADMIN — SODIUM CHLORIDE 100 ML/HR: 9 INJECTION, SOLUTION INTRAVENOUS at 06:13

## 2020-06-05 RX ADMIN — LIDOCAINE HYDROCHLORIDE 80 MG: 20 INJECTION, SOLUTION INFILTRATION; PERINEURAL at 08:23

## 2020-06-05 RX ADMIN — FENTANYL CITRATE 25 MCG: 50 INJECTION, SOLUTION INTRAMUSCULAR; INTRAVENOUS at 08:21

## 2020-06-05 RX ADMIN — FENTANYL CITRATE 25 MCG: 50 INJECTION, SOLUTION INTRAMUSCULAR; INTRAVENOUS at 08:46

## 2020-06-05 NOTE — H&P
Jefferson Pena  1965     Chief Complaint:        Chief Complaint   Patient presents with   • Varicose Veins         HPI:       PCP:  Andrew Perez MD     55 y.o. female with HTN(stable, increased risk stroke, rupture) and Obesity(uncontrolled, increased risk cardiovascular events) , varicose veins(new, increase risk venous stasis), venous insufficiency(new, increase risk venous stasis).  never smoked.  Severe varicose veins with pain  x years. Has worn compression stockings without relief.  Painful to wear.  No TIA stroke amaurosis.  No MI claudication. No other associated signs, symptoms or modifying factors.     3/2016 LEFT stab phlebectomies.  3/2020 Lower extremity venous duplex:  No dvt.  RIGHT GSV short segment proximal reflux, large varicosities with reflux.  Duplicated femoral vein, large varicosity.  LEFT GSV duplicated short segment proximal reflux, large varicosities with reflux.  Bifurcated popliteal.     The following portions of the patient's history were reviewed and updated as appropriate: allergies, current medications, past family history, past medical history, past social history, past surgical history and problem list.  Recent images independently reviewed.  Available laboratory values reviewed.     PMH:  Medical History        Past Medical History:   Diagnosis Date   • Allergic rhinitis     • Asymptomatic varicose veins of bilateral lower extremities     • Cellulitis     • Cough     • Elevated blood pressure reading without diagnosis of hypertension     • Encounter for surgical aftercare following surgery on the circulatory system       LLE Stab Phlebectomies (24) 03/25/16      • H/O mammogram 07/14/2005     may represent a small cysy or a fibroadenoma,asymmetric density represent normal parenchyma   • Hordeolum externum right lower eyelid     • Obesity     • Other mucopurulent conjunctivitis, right eye     • Pain in left leg     • Peripheral venous insufficiency     • Upper  respiratory infection     • Varicose veins of left lower leg with ulcer and inflammation (CMS/HCC)     • Venous insufficiency       chronic) (peripheral) - Wears Compression stockings            Surgical History   Past Surgical History:   Procedure Laterality Date   • COLOSTOMY       • HYSTERECTOMY       • PAP SMEAR   06/03/2004     normal   • SKIN BIOPSY       • VASCULAR SURGERY   02/02/2016   • VEIN SURGERY   03/25/2016     Left lower extremity stab phlebectomies   • WRIST SURGERY   06/02/1982     excision, ganglion cyst,dorsum of the right                Family History   Problem Relation Age of Onset   • Hypertension Mother     • Hypertension Father     • Breast cancer Maternal Grandmother     • Stroke Other     • Hyperlipidemia Other     • Heart disease Other     • Thyroid disease Other        Social History           Tobacco Use   • Smoking status: Never Smoker   • Smokeless tobacco: Never Used   Substance Use Topics   • Alcohol use: No   • Drug use: No         ALLERGIES:        Allergies   Allergen Reactions   • Sulfa Antibiotics Unknown - Low Severity       Patient is unsure   • Penicillins Rash            MEDICATIONS:     Current Outpatient Medications:   •  acyclovir (ZOVIRAX) 400 MG tablet, Take 1 tablet by mouth Daily., Disp: 7 tablet, Rfl: 0  •  loratadine (CLARITIN) 10 MG tablet, Take 1 tablet by mouth Daily., Disp: 15 tablet, Rfl: 1  •  Multiple Vitamins-Minerals (MULTIVITAMIN WITH MINERALS) tablet tablet, Take 1 tablet by mouth Daily., Disp: , Rfl:   •  naproxen (NAPROSYN) 250 MG tablet, Take 1 tablet by mouth 2 (Two) Times a Day As Needed for Pain.  **Take with food**, Disp: 60 tablet, Rfl: 2  •  valACYclovir (VALTREX) 500 MG tablet, Take 1 tablet by mouth 2 (Two) Times a Day., Disp: 60 tablet, Rfl: 6     Review of Systems   Review of Systems   Constitution: Positive for malaise/fatigue. Negative for weight loss.   Cardiovascular: Negative for chest pain, claudication and dyspnea on exertion.         "Pain with walking  Varicose veins.   Respiratory: Negative for cough and shortness of breath.    Skin: Negative for color change and poor wound healing.   Musculoskeletal: Positive for muscle weakness and myalgias.   Neurological: Negative for dizziness, numbness and weakness.         Physical Exam   Vitals        Vitals:     03/02/20 1607 03/02/20 1637   BP: 132/80 130/81   BP Location: Left arm Right arm   Patient Position: Sitting Sitting   Cuff Size: Adult Adult   Pulse: 66     Temp: 98.1 °F (36.7 °C)     TempSrc: Temporal     SpO2: 99%     Weight: 88.5 kg (195 lb)     Height: 162.6 cm (64\")           Physical Exam   Constitutional: She is oriented to person, place, and time. She is active and cooperative. She does not appear ill. No distress.   HENT:   Right Ear: Hearing normal.   Left Ear: Hearing normal.   Nose: No nasal deformity. No epistaxis.   Mouth/Throat: She does not have dentures. Normal dentition.   Cardiovascular: Normal rate and regular rhythm.   No murmur heard.  Pulses:       Carotid pulses are 2+ on the right side, and 2+ on the left side.       Radial pulses are 2+ on the right side, and 2+ on the left side.        Dorsalis pedis pulses are 2+ on the right side, and 2+ on the left side.        Posterior tibial pulses are 2+ on the right side, and 2+ on the left side.   Severe varicose veins bilateral thigh leg.  Severe bilateraldiffuse spider telangelectasias.   Pulmonary/Chest: Effort normal and breath sounds normal.   Abdominal: Soft. She exhibits no distension and no mass. There is no tenderness.   Musculoskeletal: She exhibits no deformity.   Gait normal.    Neurological: She is alert and oriented to person, place, and time. She has normal strength.   Skin: Skin is warm and dry. No cyanosis or erythema. No pallor.   No venous staining   Psychiatric: She has a normal mood and affect. Her speech is normal. Judgment and thought content normal.               BUN   Date Value Ref Range Status "   01/20/2020 20 6 - 20 mg/dL Final            Creatinine   Date Value Ref Range Status   01/20/2020 0.73 0.57 - 1.00 mg/dL Final            eGFR Non  Amer   Date Value Ref Range Status   01/20/2020 83 >60 mL/min/1.73 Final         ASSESSMENT:  Jefferson was seen today for varicose veins.     Diagnoses and all orders for this visit:     Varicose veins of both lower extremities with pain  -     Duplex Venous Lower Extremity - Bilateral CAR  -     Case Request; Standing  -     Case Request     Benign essential HTN     Venous insufficiency (chronic) (peripheral)     Class 1 obesity due to excess calories with serious comorbidity and body mass index (BMI) of 33.0 to 33.9 in adult     Other orders  -     Provide NPO Instructions to Patient; Future  -     Chlorhexidine Skin Prep; Future     PLAN:  Detailed discussion with Jefferson Pena regarding situation and options.  worsening symptoms, pain and swelling related to severe varicose veins. prior stab phlebectomies. Reflux not amenable to ablation, vein stripping. conservative measures have been unsuccessful (weight reduction, daily exercise program, compression stockings, leg elevation)  Surgical intervention is advisable.     Risks:  bleeding, infection, transfusion, blood clots, additional procedures may be required.  Benefits:  relief of symptoms, improvement in lifestyle and ability to stand for work.  Options:  vein stripping, multiple stab phlebectomy.  Understands and wishes to proceed.     RIGHT lower extremity stab phlebectomies.  GEN.  SDS.  6/5/2020     Return after above studies complete  Obesity Class  1. Increased risk cardiovascular events, sleep and breathing disorders, joint issues, type 2 diabetes mellitus. Options for weight management, heart healthy diet, exercise programs, and associated health risks of obesity discussed.  Recommended regular physical activity, progressive walking program.  Continue current medications as directed.  Will Obtain  relevant old records.     Thank you for the opportunity to participate in this patient's care.     Copy to primary care provider.     EMR Dragon/Transcription disclaimer:   Much of this encounter note is an electronic transcription/translation of spoken language to printed text. The electronic translation of spoken language may permit erroneous, or at times, nonsensical words or phrases to be inadvertently transcribed; Although I have reviewed the note for such errors, some may still exist.

## 2020-06-05 NOTE — ANESTHESIA PROCEDURE NOTES
Airway  Urgency: elective    Date/Time: 6/5/2020 8:25 AM  Airway not difficult    General Information and Staff    Patient location during procedure: OR  CRNA: Shashank Nielsen CRNA    Indications and Patient Condition  Indications for airway management: airway protection    Preoxygenated: yes  MILS maintained throughout  Mask difficulty assessment: 0 - not attempted    Final Airway Details  Final airway type: supraglottic airway      Successful airway: I-gel  Size 4    Number of attempts at approach: 1  Assessment: lips, teeth, and gum same as pre-op

## 2020-06-05 NOTE — ANESTHESIA PREPROCEDURE EVALUATION
Anesthesia Evaluation     Patient summary reviewed and Nursing notes reviewed   NPO Solid Status: > 8 hours  NPO Liquid Status: > 8 hours           Airway   Mallampati: II  TM distance: >3 FB  Neck ROM: full  no difficulty expected  Dental - normal exam     Pulmonary - normal exam   (+) recent URI,   Cardiovascular - normal exam    (+) hypertension,       Neuro/Psych  GI/Hepatic/Renal/Endo    (+) obesity,       Musculoskeletal     (+) back pain,   Abdominal  - normal exam   Substance History      OB/GYN          Other   arthritis,                      Anesthesia Plan    ASA 3     general     intravenous induction     Anesthetic plan, all risks, benefits, and alternatives have been provided, discussed and informed consent has been obtained with: patient.

## 2020-06-05 NOTE — OP NOTE
OPERATIVE NOTE  Jefferson Pena  1965  6/5/2020    PREOP DIAGNOSES:  Varicose veins RIGHT lower extremity  Varicose veins of both lower extremities with pain [I83.813]    POSTOP DIAGNOSES:    Varicose veins RIGHT lower extremity    PROCEDURE:   Stab phlebectomies RIGHT lower extremity    SURGEON: DESMOND De León MD FACS [facility]    ASSISTANT: Chhaya Up CFA    ANESTHESIA: General ET     ESTIMATED BLOOD LOSS: minimal    SPECIMEN:  None    COMPLICATIONS: None    DESCRIPTION OF OPERATION: Patient taken to the operating room placed in supine position, general anesthesia induced.  varicose veins had been marked in standing position with tourniquet in same day surgery. Prepped and draped in sterile fashion. Multiple stab phlebectomies were performed in the RIGHT lower extremity for excision of painful varicose veins (34 incisions). hemostasis was obtained. incisions closed with Mastisol and Steri-Strips. leg was wrapped with snug Ace bandage from foot to groin. patient tolerated procedure well transferred to PACU in stable condition.          This document has been electronically signed by Manolo De León MD on June 5, 2020 09:32

## 2020-06-05 NOTE — ANESTHESIA POSTPROCEDURE EVALUATION
Patient: Jefferson Pena    Procedure Summary     Date:  06/05/20 Room / Location:  [facility] OR 05 / [facility] OR    Anesthesia Start:  0819 Anesthesia Stop:  0940    Procedure:  lower extremity stab PHLEBECTOMies (Right ) Diagnosis:       Varicose veins of both lower extremities with pain      (Varicose veins of both lower extremities with pain [I83.813])    Surgeon:  Manolo De León MD Provider:  Sean Randolph MD    Anesthesia Type:  general ASA Status:  3          Anesthesia Type: general    Vitals  No vitals data found for the desired time range.          Post Anesthesia Care and Evaluation    Patient location during evaluation: PACU  Patient participation: waiting for patient participation  Level of consciousness: responsive to painful stimuli  Pain management: adequate  Airway patency: patent  Anesthetic complications: No anesthetic complications    Cardiovascular status: acceptable  Respiratory status: acceptable  Hydration status: acceptable

## 2020-06-09 ENCOUNTER — OFFICE VISIT (OUTPATIENT)
Dept: FAMILY MEDICINE CLINIC | Facility: CLINIC | Age: 55
End: 2020-06-09

## 2020-06-09 VITALS
HEIGHT: 65 IN | BODY MASS INDEX: 31.9 KG/M2 | OXYGEN SATURATION: 97 % | WEIGHT: 191.44 LBS | DIASTOLIC BLOOD PRESSURE: 70 MMHG | HEART RATE: 75 BPM | SYSTOLIC BLOOD PRESSURE: 138 MMHG

## 2020-06-09 DIAGNOSIS — Z98.890 RECENT MAJOR SURGERY: ICD-10-CM

## 2020-06-09 DIAGNOSIS — J30.9 ALLERGIC RHINITIS, UNSPECIFIED SEASONALITY, UNSPECIFIED TRIGGER: Primary | ICD-10-CM

## 2020-06-09 PROCEDURE — 99213 OFFICE O/P EST LOW 20 MIN: CPT | Performed by: FAMILY MEDICINE

## 2020-06-09 NOTE — PROGRESS NOTES
"Arin Pena is a 55 y.o. female.   ,Cc: follow up of chronic medical issues    HPI The patient has a history of allergic rhinitis.She is stable. She has had surgery of varicose veins. They have been doiing.  The following portions of the patient's history were reviewed and updated as appropriate: allergies, current medications, past family history, past medical history, past social history, past surgical history and problem list.    Review of Systems   Constitutional: Negative for fatigue and fever.   Respiratory: Negative for cough, chest tightness and stridor.    Cardiovascular: Negative for chest pain, palpitations and leg swelling.   Gastrointestinal: Negative for abdominal pain.       Objective   Physical Exam   Constitutional: She is oriented to person, place, and time. She appears well-developed and well-nourished.   HENT:   Head: Normocephalic and atraumatic.   Right Ear: External ear normal.   Left Ear: External ear normal.   Nose: Nose normal.   Mouth/Throat: Oropharynx is clear and moist.   Eyes: Pupils are equal, round, and reactive to light. EOM are normal.   Neck: Normal range of motion.   Cardiovascular: Normal rate, regular rhythm and normal heart sounds. Exam reveals no gallop and no friction rub.   No murmur heard.  Pulmonary/Chest: Effort normal and breath sounds normal. No stridor. No respiratory distress. She has no wheezes. She has no rales.   Abdominal: Soft. Bowel sounds are normal. She exhibits no distension. There is no tenderness. There is no guarding.   Neurological: She is alert and oriented to person, place, and time.   Skin: Skin is warm.   The right leg is looking good. She has several areas where she had the varicose veins were done.   Vitals reviewed.        Visit Vitals  /70   Pulse 75   Ht 165.1 cm (65\")   Wt 86.8 kg (191 lb 7 oz)   LMP 07/06/2004 (Within Months)   SpO2 97%   Breastfeeding No   BMI 31.86 kg/m²     Body mass index is 31.86 " kg/m².      Assessment/Plan   Jefferson was seen today for follow-up.    Diagnoses and all orders for this visit:    Allergic rhinitis, unspecified seasonality, unspecified trigger    Recent major surgery    Return to the clinic in 3 month/s.  Will contact with results as needed.

## 2020-06-16 ENCOUNTER — OFFICE VISIT (OUTPATIENT)
Dept: CARDIAC SURGERY | Facility: CLINIC | Age: 55
End: 2020-06-16

## 2020-06-16 VITALS
HEIGHT: 65 IN | OXYGEN SATURATION: 98 % | BODY MASS INDEX: 32.49 KG/M2 | SYSTOLIC BLOOD PRESSURE: 140 MMHG | HEART RATE: 69 BPM | DIASTOLIC BLOOD PRESSURE: 72 MMHG | TEMPERATURE: 98.7 F | WEIGHT: 195 LBS

## 2020-06-16 DIAGNOSIS — I83.813 VARICOSE VEINS OF BOTH LOWER EXTREMITIES WITH PAIN: ICD-10-CM

## 2020-06-16 DIAGNOSIS — I87.2 VENOUS INSUFFICIENCY (CHRONIC) (PERIPHERAL): ICD-10-CM

## 2020-06-16 DIAGNOSIS — E66.09 CLASS 1 OBESITY DUE TO EXCESS CALORIES WITH SERIOUS COMORBIDITY AND BODY MASS INDEX (BMI) OF 33.0 TO 33.9 IN ADULT: ICD-10-CM

## 2020-06-16 DIAGNOSIS — Z09 FOLLOW-UP SURGERY CARE: Primary | ICD-10-CM

## 2020-06-16 PROCEDURE — 99024 POSTOP FOLLOW-UP VISIT: CPT | Performed by: NURSE PRACTITIONER

## 2020-06-16 NOTE — PATIENT INSTRUCTIONS
Stable Post Op RIGHT lower extremity stab phlebectomy:  Progressing well  Medical Management: compression stockings  Signs and symptoms of infection including drainage from operative site, redness, swelling, with associated fever and/or chills notify [facility] immediately for wound check.   Ok to return to work   Return 4 weeks - wound check

## 2020-06-17 NOTE — PROGRESS NOTES
Aníbalia Nadja Pena  1965    Chief Complaint:    Chief Complaint   Patient presents with   • Follow-up     WOUND CK       HPI:      PCP:  Andrew Perez MD    55 y.o. female with HTN(stable, increased risk stroke, rupture) and Obesity(uncontrolled, increased risk cardiovascular events) , varicose veins(new, increase risk venous stasis), venous insufficiency(new, increase risk venous stasis).  never smoked.  Severe varicose veins with pain  x years. Has worn compression stockings without relief.  Painful to wear.  No TIA stroke amaurosis.  No MI claudication. No other associated signs, symptoms or modifying factors.    3/2016 LEFT stab phlebectomies.  3/2020 Lower extremity venous duplex:  No dvt.  RIGHT GSV short segment proximal reflux, large varicosities with reflux.  Duplicated femoral vein, large varicosity.  LEFT GSV duplicated short segment proximal reflux, large varicosities with reflux.  Bifurcated popliteal.  6/5/2020: RIGHT Stab Phlebectomies (34)    The following portions of the patient's history were reviewed and updated as appropriate: allergies, current medications, past family history, past medical history, past social history, past surgical history and problem list.  Recent images independently reviewed.  Available laboratory values reviewed.    PMH:  Past Medical History:   Diagnosis Date   • Allergic rhinitis    • Arthritis    • Asymptomatic varicose veins of bilateral lower extremities    • Cellulitis    • Cough    • Elevated blood pressure reading without diagnosis of hypertension    • Encounter for surgical aftercare following surgery on the circulatory system     LLE Stab Phlebectomies (24) 03/25/16      • H/O mammogram 07/14/2005    may represent a small cysy or a fibroadenoma,asymmetric density represent normal parenchyma   • Hordeolum externum right lower eyelid    • Obesity    • Other mucopurulent conjunctivitis, right eye    • Pain in left leg    • Peripheral venous insufficiency     • Upper respiratory infection    • Varicose veins of left lower leg with ulcer and inflammation (CMS/HCC)    • Venous insufficiency     chronic) (peripheral) - Wears Compression stockings        Past Surgical History:   Procedure Laterality Date   • COLOSTOMY     • HYSTERECTOMY     • MICROAMBULATORY PHLEBECTOMY Right 6/5/2020    Procedure: lower extremity stab PHLEBECTOMies;  Surgeon: Manolo De León MD;  Location: [facility];  Service: Vascular;  Laterality: Right;   • PAP SMEAR  06/03/2004    normal   • SKIN BIOPSY     • VASCULAR SURGERY  02/02/2016   • VEIN SURGERY  03/25/2016    Left lower extremity stab phlebectomies   • WRIST SURGERY  06/02/1982    excision, ganglion cyst,dorsum of the right      Family History   Problem Relation Age of Onset   • Hypertension Mother    • Hypertension Father    • Breast cancer Maternal Grandmother    • Stroke Other    • Hyperlipidemia Other    • Heart disease Other    • Thyroid disease Other      Social History     Tobacco Use   • Smoking status: Never Smoker   • Smokeless tobacco: Never Used   Substance Use Topics   • Alcohol use: No   • Drug use: No       ALLERGIES:  Allergies   Allergen Reactions   • Sulfa Antibiotics Unknown - Low Severity     Patient is unsure   • Penicillins Rash         MEDICATIONS:    Current Outpatient Medications:   •  HYDROcodone-acetaminophen (NORCO) 5-325 MG per tablet, Take 1 tablet by mouth Every 4 (Four) Hours As Needed for Pain., Disp: 40 tablet, Rfl: 0  •  loratadine (CLARITIN) 10 MG tablet, Take 1 tablet by mouth Daily., Disp: 15 tablet, Rfl: 1  •  Multiple Vitamins-Minerals (MULTIVITAMIN WITH MINERALS) tablet tablet, Take 1 tablet by mouth Daily., Disp: , Rfl:   •  naproxen (NAPROSYN) 500 MG tablet, Take 1 tablet by mouth 2 (Two) Times a Day With Meals.  **Take with food**, Disp: 60 tablet, Rfl: 0  •  valACYclovir (VALTREX) 500 MG tablet, Take 1 tablet by mouth 2 (Two) Times a Day., Disp: 60 tablet, Rfl: 6    Review of Systems  "  Review of Systems   Constitution: Positive for malaise/fatigue. Negative for weight loss.   Cardiovascular: Negative for chest pain, claudication and dyspnea on exertion.   Respiratory: Negative for cough and shortness of breath.    Skin: Negative for color change and poor wound healing.   Musculoskeletal: Positive for muscle weakness and myalgias.   Neurological: Negative for dizziness, numbness and weakness.       Physical Exam   Vitals:    06/16/20 0923   BP: 140/72   BP Location: Left arm   Patient Position: Sitting   Cuff Size: Adult   Pulse: 69   Temp: 98.7 °F (37.1 °C)   TempSrc: Temporal   SpO2: 98%   Weight: 88.5 kg (195 lb)   Height: 165.1 cm (65\")     Physical Exam   Constitutional: She is oriented to person, place, and time. She is active and cooperative. She does not appear ill. No distress.   HENT:   Right Ear: Hearing normal.   Left Ear: Hearing normal.   Nose: No nasal deformity. No epistaxis.   Mouth/Throat: She does not have dentures. Normal dentition.   Cardiovascular: Normal rate and regular rhythm.   No murmur heard.  Pulses:       Carotid pulses are 2+ on the right side, and 2+ on the left side.       Radial pulses are 2+ on the right side, and 2+ on the left side.        Dorsalis pedis pulses are 2+ on the right side, and 2+ on the left side.        Posterior tibial pulses are 2+ on the right side, and 2+ on the left side.   Severe bilateral diffuse spider telangelectasias.   Pulmonary/Chest: Effort normal and breath sounds normal.   Abdominal: Soft. She exhibits no distension and no mass. There is no tenderness.   Musculoskeletal: She exhibits no deformity.   Gait normal.    Neurological: She is alert and oriented to person, place, and time. She has normal strength.   Skin: Skin is warm and dry. Bruising noted. No cyanosis or erythema. No pallor.   No venous staining   Psychiatric: She has a normal mood and affect. Her speech is normal. Judgment and thought content normal.       BUN   Date " Value Ref Range Status   01/20/2020 20 6 - 20 mg/dL Final     Creatinine   Date Value Ref Range Status   01/20/2020 0.73 0.57 - 1.00 mg/dL Final     eGFR Non  Amer   Date Value Ref Range Status   01/20/2020 83 >60 mL/min/1.73 Final       ASSESSMENT:PLAN: Detailed discussion regarding risks, benefits, and treatment plan. Images independently reviewed. Patient understands, agrees, and wishes to proceed with plan.      1. Follow-up surgery care  Stable Post Op RIGHT lower extremity stab phlebectomy:  Progressing well  Medical Management: compression stockings  Signs and symptoms of infection including drainage from operative site, redness, swelling, with associated fever and/or chills notify Heart and Vascular center immediately for wound check.   Ok to return to work   Return 4 weeks - wound check    2. Varicose veins of both lower extremities with pain  Pain resolved.     3. Venous insufficiency (chronic) (peripheral)  Compression stockings    4. Class 1 obesity due to excess calories with serious comorbidity and body mass index (BMI) of 33.0 to 33.9 in adult  Your BMI is 32 and falls within the overweight range. BMI is strongly correlated with increased health risks. Review options for weight management, heart healthy diet, and exercise programs.             This document has been electronically signed by JENS Li on June 17, 2020 11:01

## 2020-06-24 NOTE — PROGRESS NOTES
Subjective   Jefferson Pena is a 52 y.o. female.     History of Present Illness The patient comes in for check of her cough. She is feeling better. She is still using her inhaler.    The following portions of the patient's history were reviewed and updated as appropriate: allergies, current medications, past family history, past medical history, past social history, past surgical history and problem list.    Review of Systems   Constitutional: Negative for fatigue and fever.   Respiratory: Negative for cough, chest tightness and stridor.    Cardiovascular: Negative for chest pain and leg swelling.       Objective   Physical Exam   Constitutional: She appears well-developed and well-nourished.   HENT:   Head: Normocephalic and atraumatic.   Right Ear: External ear normal.   Left Ear: External ear normal.   Nose: Nose normal.   Mouth/Throat: Oropharynx is clear and moist.   Eyes: Pupils are equal, round, and reactive to light.   Neck: Normal range of motion. Neck supple.   Cardiovascular: Normal rate, regular rhythm and normal heart sounds.  Exam reveals no gallop and no friction rub.    No murmur heard.  Pulmonary/Chest: Effort normal and breath sounds normal. No respiratory distress. She has no wheezes. She has no rales.   Abdominal: Soft. Bowel sounds are normal. She exhibits no distension. There is no tenderness.   Vitals reviewed.      Assessment/Plan   Jefferson was seen today for follow-up and bronchitis.    Diagnoses and all orders for this visit:    Cough    Bronchitis      Continue on current medications.  Follow up as needed.          Detail Level: Simple

## 2020-07-15 ENCOUNTER — OFFICE VISIT (OUTPATIENT)
Dept: CARDIAC SURGERY | Facility: CLINIC | Age: 55
End: 2020-07-15

## 2020-07-15 VITALS
SYSTOLIC BLOOD PRESSURE: 140 MMHG | OXYGEN SATURATION: 98 % | TEMPERATURE: 98.2 F | DIASTOLIC BLOOD PRESSURE: 80 MMHG | WEIGHT: 192 LBS | HEART RATE: 67 BPM | BODY MASS INDEX: 31.99 KG/M2 | HEIGHT: 65 IN

## 2020-07-15 DIAGNOSIS — I10 BENIGN ESSENTIAL HTN: ICD-10-CM

## 2020-07-15 DIAGNOSIS — Z09 FOLLOW-UP SURGERY CARE: Primary | ICD-10-CM

## 2020-07-15 DIAGNOSIS — I83.813 VARICOSE VEINS OF BOTH LOWER EXTREMITIES WITH PAIN: ICD-10-CM

## 2020-07-15 DIAGNOSIS — I87.2 VENOUS INSUFFICIENCY (CHRONIC) (PERIPHERAL): ICD-10-CM

## 2020-07-15 PROCEDURE — 99024 POSTOP FOLLOW-UP VISIT: CPT | Performed by: NURSE PRACTITIONER

## 2020-07-15 NOTE — PATIENT INSTRUCTIONS
Stable Post Op Stab phlebectomies:  Progressing well  Medical Management compression stockings  Signs and symptoms of infection including drainage from operative site, redness, swelling, with associated fever and/or chills notify Heart and Vascular center immediately for wound check.    Return as needed  Released from surgical services

## 2020-07-16 NOTE — PROGRESS NOTES
Jefferson Saez Jean  1965    Chief Complaint:    Chief Complaint   Patient presents with   • Follow-up     4 wk varicose veins       HPI:      PCP:  Andrew Perez MD    55 y.o. female with HTN(stable, increased risk stroke, rupture) and Obesity(uncontrolled, increased risk cardiovascular events) , varicose veins(new, increase risk venous stasis), venous insufficiency(new, increase risk venous stasis).  never smoked.  Severe varicose veins with pain  x years. Has worn compression stockings without relief.  Painful to wear.  No TIA stroke amaurosis.  No MI claudication. No other associated signs, symptoms or modifying factors. Progressing well at work.    3/2016 LEFT stab phlebectomies.  3/2020 Lower extremity venous duplex:  No dvt.  RIGHT GSV short segment proximal reflux, large varicosities with reflux.  Duplicated femoral vein, large varicosity.  LEFT GSV duplicated short segment proximal reflux, large varicosities with reflux.  Bifurcated popliteal.  6/5/2020: RIGHT Stab Phlebectomies (34)    The following portions of the patient's history were reviewed and updated as appropriate: allergies, current medications, past family history, past medical history, past social history, past surgical history and problem list.  Recent images independently reviewed.  Available laboratory values reviewed.    PMH:  Past Medical History:   Diagnosis Date   • Allergic rhinitis    • Arthritis    • Asymptomatic varicose veins of bilateral lower extremities    • Cellulitis    • Cough    • Elevated blood pressure reading without diagnosis of hypertension    • Encounter for surgical aftercare following surgery on the circulatory system     LLE Stab Phlebectomies (24) 03/25/16      • H/O mammogram 07/14/2005    may represent a small cysy or a fibroadenoma,asymmetric density represent normal parenchyma   • Hordeolum externum right lower eyelid    • Obesity    • Other mucopurulent conjunctivitis, right eye    • Pain in left leg     • Peripheral venous insufficiency    • Upper respiratory infection    • Varicose veins of left lower leg with ulcer and inflammation (CMS/HCC)    • Venous insufficiency     chronic) (peripheral) - Wears Compression stockings        Past Surgical History:   Procedure Laterality Date   • COLOSTOMY     • HYSTERECTOMY     • MICROAMBULATORY PHLEBECTOMY Right 6/5/2020    Procedure: lower extremity stab PHLEBECTOMies;  Surgeon: Manolo De León MD;  Location: [facility];  Service: Vascular;  Laterality: Right;   • PAP SMEAR  06/03/2004    normal   • SKIN BIOPSY     • VASCULAR SURGERY  02/02/2016   • VEIN SURGERY  03/25/2016    Left lower extremity stab phlebectomies   • WRIST SURGERY  06/02/1982    excision, ganglion cyst,dorsum of the right      Family History   Problem Relation Age of Onset   • Hypertension Mother    • Hypertension Father    • Breast cancer Maternal Grandmother    • Stroke Other    • Hyperlipidemia Other    • Heart disease Other    • Thyroid disease Other      Social History     Tobacco Use   • Smoking status: Never Smoker   • Smokeless tobacco: Never Used   Substance Use Topics   • Alcohol use: No   • Drug use: No       ALLERGIES:  Allergies   Allergen Reactions   • Sulfa Antibiotics Unknown - Low Severity     Patient is unsure   • Penicillins Rash         MEDICATIONS:    Current Outpatient Medications:   •  HYDROcodone-acetaminophen (NORCO) 5-325 MG per tablet, Take 1 tablet by mouth Every 4 (Four) Hours As Needed for Pain., Disp: 40 tablet, Rfl: 0  •  loratadine (CLARITIN) 10 MG tablet, Take 1 tablet by mouth Daily., Disp: 15 tablet, Rfl: 1  •  Multiple Vitamins-Minerals (MULTIVITAMIN WITH MINERALS) tablet tablet, Take 1 tablet by mouth Daily., Disp: , Rfl:   •  naproxen (NAPROSYN) 500 MG tablet, Take 1 tablet by mouth 2 (Two) Times a Day With Meals.  **Take with food**, Disp: 60 tablet, Rfl: 0  •  valACYclovir (VALTREX) 500 MG tablet, Take 1 tablet by mouth 2 (Two) Times a Day., Disp: 60  "tablet, Rfl: 6    Review of Systems   Review of Systems   Constitution: Positive for malaise/fatigue. Negative for weight loss.   Cardiovascular: Negative for chest pain, claudication and dyspnea on exertion.   Respiratory: Negative for cough and shortness of breath.    Skin: Negative for color change and poor wound healing.   Musculoskeletal: Positive for muscle weakness and myalgias.   Neurological: Negative for dizziness, numbness and weakness.       Physical Exam   Vitals:    07/15/20 1518   BP: 140/80   BP Location: Right arm   Patient Position: Sitting   Cuff Size: Adult   Pulse: 67   Temp: 98.2 °F (36.8 °C)   TempSrc: Temporal   SpO2: 98%   Weight: 87.1 kg (192 lb)   Height: 165.1 cm (65\")     Physical Exam   Constitutional: She is oriented to person, place, and time. She is active and cooperative. She does not appear ill. No distress.   HENT:   Right Ear: Hearing normal.   Left Ear: Hearing normal.   Nose: No nasal deformity. No epistaxis.   Mouth/Throat: She does not have dentures. Normal dentition.   Cardiovascular: Normal rate and regular rhythm.   No murmur heard.  Pulses:       Carotid pulses are 2+ on the right side, and 2+ on the left side.       Radial pulses are 2+ on the right side, and 2+ on the left side.        Dorsalis pedis pulses are 2+ on the right side, and 2+ on the left side.        Posterior tibial pulses are 2+ on the right side, and 2+ on the left side.   Severe bilateral diffuse spider telangelectasias.   Pulmonary/Chest: Effort normal and breath sounds normal.   Abdominal: Soft. She exhibits no distension and no mass. There is no tenderness.   Musculoskeletal: She exhibits no deformity.   Gait normal.    Neurological: She is alert and oriented to person, place, and time. She has normal strength.   Skin: Skin is warm and dry. Bruising noted. No cyanosis or erythema. No pallor.   No venous staining   Psychiatric: She has a normal mood and affect. Her speech is normal. Judgment and " thought content normal.       BUN   Date Value Ref Range Status   01/20/2020 20 6 - 20 mg/dL Final     Creatinine   Date Value Ref Range Status   01/20/2020 0.73 0.57 - 1.00 mg/dL Final     eGFR Non  Amer   Date Value Ref Range Status   01/20/2020 83 >60 mL/min/1.73 Final       ASSESSMENT:PLAN: Detailed discussion regarding risks, benefits, and treatment plan. Images independently reviewed. Patient understands, agrees, and wishes to proceed with plan.      1. Follow-up surgery care  Stable Post Op Stab phlebectomies:  Progressing well  Medical Management compression stockings  Signs and symptoms of infection including drainage from operative site, redness, swelling, with associated fever and/or chills notify Heart and Vascular center immediately for wound check.    Return as needed  Released from surgical services    2. Varicose veins of both lower extremities with pain      3. Benign essential HTN      4. Venous insufficiency (chronic) (peripheral)               This document has been electronically signed by JENS Li on July 16, 2020 10:45

## 2020-11-02 ENCOUNTER — OFFICE VISIT (OUTPATIENT)
Dept: FAMILY MEDICINE CLINIC | Facility: CLINIC | Age: 55
End: 2020-11-02

## 2020-11-02 VITALS
HEART RATE: 68 BPM | SYSTOLIC BLOOD PRESSURE: 126 MMHG | WEIGHT: 196.9 LBS | BODY MASS INDEX: 32.8 KG/M2 | DIASTOLIC BLOOD PRESSURE: 82 MMHG | OXYGEN SATURATION: 98 % | HEIGHT: 65 IN

## 2020-11-02 DIAGNOSIS — S80.12XA CONTUSION OF LEFT LOWER EXTREMITY, INITIAL ENCOUNTER: Primary | ICD-10-CM

## 2020-11-02 DIAGNOSIS — S89.92XA INJURY OF LEFT LOWER EXTREMITY, INITIAL ENCOUNTER: ICD-10-CM

## 2020-11-02 PROCEDURE — 99214 OFFICE O/P EST MOD 30 MIN: CPT | Performed by: FAMILY MEDICINE

## 2020-11-02 RX ORDER — METHOCARBAMOL 500 MG/1
500 TABLET, FILM COATED ORAL
Qty: 30 TABLET | Refills: 1 | Status: SHIPPED | OUTPATIENT
Start: 2020-11-02 | End: 2020-12-09 | Stop reason: SDUPTHER

## 2020-11-02 NOTE — PROGRESS NOTES
Subjective   Jefferson Pena is a 55 y.o. female.    cc: leg injury  HPI   The patient slipped and fell  Friday 10/29/20. She injured her left leg..She has a bruise on the posterior aspect of her legSHe is able to walk.    The following portions of the patient's history were reviewed and updated as appropriate: allergies, current medications, past family history, past medical history, past social history, past surgical history and problem list.    Review of Systems   Constitutional: Negative for fatigue.   Respiratory: Negative for cough, chest tightness and stridor.    Cardiovascular: Negative for chest pain, palpitations and leg swelling.   Musculoskeletal: Positive for arthralgias and gait problem. Negative for back pain, joint swelling and myalgias.       Objective   Physical Exam  Vitals signs reviewed.   Constitutional:       Appearance: Normal appearance.   HENT:      Head: Normocephalic and atraumatic.      Right Ear: Tympanic membrane and external ear normal.      Left Ear: Tympanic membrane and external ear normal.      Nose: Nose normal.      Mouth/Throat:      Mouth: Mucous membranes are moist.      Pharynx: Oropharynx is clear.   Eyes:      Conjunctiva/sclera: Conjunctivae normal.      Pupils: Pupils are equal, round, and reactive to light.   Neck:      Musculoskeletal: Normal range of motion and neck supple.   Cardiovascular:      Rate and Rhythm: Normal rate and regular rhythm.      Heart sounds: Normal heart sounds. No murmur. No friction rub. No gallop.    Pulmonary:      Effort: Pulmonary effort is normal. No respiratory distress.      Breath sounds: Normal breath sounds. No stridor. No wheezing, rhonchi or rales.   Chest:      Chest wall: No tenderness.   Abdominal:      General: Abdomen is flat. Bowel sounds are normal. There is no distension.      Palpations: Abdomen is soft.      Tenderness: There is no abdominal tenderness.   Musculoskeletal:      Comments: There is a large bruise covering the  "posterior aspect of the left thigh and lower leg on the left.   Neurological:      Mental Status: She is alert.           Visit Vitals  /82   Pulse 68   Ht 165.1 cm (65\")   Wt 89.3 kg (196 lb 14.4 oz)   LMP 07/06/2004 (Within Months)   SpO2 98%   BMI 32.77 kg/m²     Body mass index is 32.77 kg/m².      Assessment/Plan   Diagnoses and all orders for this visit:    1. Contusion of left lower extremity, initial encounter (Primary)  -     XR Femur 2 View Left (In Office)  -     XR Lower Extremity Infant Left; Future    2. Injury of left lower extremity, initial encounter  -     XR Femur 2 View Left (In Office)  -     XR Lower Extremity Infant Left; Future    Other orders  -     methocarbamol (Robaxin) 500 MG tablet; One at bed time.  Dispense: 30 tablet; Refill: 1    Tylenol for pain..    Return to the clinic in 2 week/s.  Will contact with results as needed.    "

## 2020-11-16 ENCOUNTER — OFFICE VISIT (OUTPATIENT)
Dept: FAMILY MEDICINE CLINIC | Facility: CLINIC | Age: 55
End: 2020-11-16

## 2020-11-16 VITALS
BODY MASS INDEX: 31.74 KG/M2 | HEIGHT: 65 IN | HEART RATE: 67 BPM | OXYGEN SATURATION: 98 % | DIASTOLIC BLOOD PRESSURE: 72 MMHG | RESPIRATION RATE: 20 BRPM | SYSTOLIC BLOOD PRESSURE: 126 MMHG | WEIGHT: 190.5 LBS

## 2020-11-16 DIAGNOSIS — M79.89 LEG SWELLING: Primary | ICD-10-CM

## 2020-11-16 DIAGNOSIS — M79.605 PAIN OF LEFT LOWER EXTREMITY: ICD-10-CM

## 2020-11-16 PROCEDURE — 99214 OFFICE O/P EST MOD 30 MIN: CPT | Performed by: FAMILY MEDICINE

## 2020-11-16 NOTE — PROGRESS NOTES
Arin Pena is a 55 y.o. female.    cc: leg pain and swelling  HPI    The following portions of the patient's history were reviewed and updated as appropriate: allergies, current medications, past family history, past medical history, past social history, past surgical history and problem list.    Review of Systems   Constitutional: Negative for fatigue.   Respiratory: Negative for cough and stridor.    Cardiovascular: Negative for chest pain and palpitations.   Musculoskeletal: Negative for arthralgias and myalgias.       Objective   Physical Exam  Vitals signs reviewed.   Constitutional:       Appearance: Normal appearance.   HENT:      Head: Normocephalic and atraumatic.      Right Ear: Tympanic membrane, ear canal and external ear normal.      Left Ear: Tympanic membrane, ear canal and external ear normal.      Nose: Nose normal.      Mouth/Throat:      Mouth: Mucous membranes are moist.      Pharynx: Oropharynx is clear.   Eyes:      Extraocular Movements: Extraocular movements intact.      Pupils: Pupils are equal, round, and reactive to light.   Neck:      Musculoskeletal: Normal range of motion and neck supple.   Cardiovascular:      Rate and Rhythm: Normal rate and regular rhythm.      Heart sounds: Normal heart sounds. No murmur. No friction rub. No gallop.    Pulmonary:      Effort: Pulmonary effort is normal. No respiratory distress.      Breath sounds: Normal breath sounds. No stridor. No wheezing, rhonchi or rales.   Chest:      Chest wall: No tenderness.   Abdominal:      General: Abdomen is flat. Bowel sounds are normal.      Palpations: Abdomen is soft.      Tenderness: There is no abdominal tenderness.   Musculoskeletal:      Comments: There is less bruising of the left leg. It is swollen more than the right leg.   Skin:     General: Skin is warm and dry.   Neurological:      Mental Status: She is alert.           Visit Vitals  /72 (BP Location: Left arm, Patient Position:  "Sitting, Cuff Size: Adult)   Pulse 67   Resp 20   Ht 165.1 cm (65\")   Wt 86.4 kg (190 lb 8 oz)   LMP 07/06/2004 (Within Months)   SpO2 98%   BMI 31.70 kg/m²     Body mass index is 31.7 kg/m².      Assessment/Plan   Diagnoses and all orders for this visit:    1. Leg swelling (Primary)  -     Duplex Venous Lower Extremity - Left CAR; Future    2. Pain of left lower extremity  -     Duplex Venous Lower Extremity - Left CAR; Future    Return to the clinic in 3 week/s.  Will contact with results as needed.    "

## 2020-11-23 ENCOUNTER — TELEPHONE (OUTPATIENT)
Dept: FAMILY MEDICINE CLINIC | Facility: CLINIC | Age: 55
End: 2020-11-23

## 2020-11-23 NOTE — TELEPHONE ENCOUNTER
Pt is wanting to check and see if we received the medical leave paperwork from [employer]. Please call patient. Thanks!

## 2020-12-09 ENCOUNTER — OFFICE VISIT (OUTPATIENT)
Dept: FAMILY MEDICINE CLINIC | Facility: CLINIC | Age: 55
End: 2020-12-09

## 2020-12-09 VITALS
HEART RATE: 74 BPM | SYSTOLIC BLOOD PRESSURE: 128 MMHG | HEIGHT: 65 IN | BODY MASS INDEX: 31.55 KG/M2 | OXYGEN SATURATION: 98 % | WEIGHT: 189.38 LBS | DIASTOLIC BLOOD PRESSURE: 70 MMHG

## 2020-12-09 DIAGNOSIS — M19.90 OSTEOARTHRITIS, UNSPECIFIED OSTEOARTHRITIS TYPE, UNSPECIFIED SITE: Primary | ICD-10-CM

## 2020-12-09 DIAGNOSIS — J30.89 ALLERGIC RHINITIS DUE TO OTHER ALLERGIC TRIGGER, UNSPECIFIED SEASONALITY: ICD-10-CM

## 2020-12-09 PROCEDURE — 99214 OFFICE O/P EST MOD 30 MIN: CPT | Performed by: FAMILY MEDICINE

## 2020-12-09 RX ORDER — METHOCARBAMOL 500 MG/1
500 TABLET, FILM COATED ORAL
Qty: 30 TABLET | Refills: 1 | Status: SHIPPED | OUTPATIENT
Start: 2020-12-09 | End: 2021-03-09 | Stop reason: SDUPTHER

## 2020-12-09 RX ORDER — LORATADINE 10 MG/1
10 TABLET ORAL DAILY
Qty: 15 TABLET | Refills: 1 | Status: SHIPPED | OUTPATIENT
Start: 2020-12-09 | End: 2021-03-09 | Stop reason: SDUPTHER

## 2020-12-09 RX ORDER — NAPROXEN 500 MG/1
500 TABLET ORAL 2 TIMES DAILY WITH MEALS
Qty: 60 TABLET | Refills: 0 | Status: SHIPPED | OUTPATIENT
Start: 2020-12-09 | End: 2021-03-09 | Stop reason: SDUPTHER

## 2020-12-09 NOTE — PROGRESS NOTES
Subjective   Jefferson Pena is a 55 y.o. female.   Cc: follow up of chronic medical issues    Leg Swelling  This is a chronic problem. The current episode started more than 1 year ago. Pertinent negatives include no abdominal pain, anorexia, arthralgias, chest pain, chills, joint swelling, nausea, vertigo or visual change.   Osteoarthritis  Presents for follow-up visit. She complains of joint warmth. She reports no pain, stiffness or joint swelling. The symptoms have been improving. Affected locations include the left knee and left hip. Her past medical history is significant for osteoarthritis.   Allergic Rhinitis is stable she has some congestion.  She is having some weight loss.She is down 3 to 6 pounds.   The following portions of the patient's history were reviewed and updated as appropriate: allergies, current medications, past family history, past medical history, past social history, past surgical history and problem list.    Review of Systems   Constitutional: Negative for chills.   Cardiovascular: Negative for chest pain.   Gastrointestinal: Negative for abdominal pain, anorexia and nausea.   Musculoskeletal: Negative for arthralgias, joint swelling and stiffness.   Neurological: Negative for vertigo.       Objective   Physical Exam  Vitals signs reviewed.   Constitutional:       Appearance: Normal appearance.   HENT:      Head: Normocephalic and atraumatic.      Right Ear: Tympanic membrane, ear canal and external ear normal.      Left Ear: Tympanic membrane and external ear normal.      Nose:      Comments: Nose is congested.     Mouth/Throat:      Mouth: Mucous membranes are moist.      Pharynx: Oropharynx is clear.   Eyes:      Extraocular Movements: Extraocular movements intact.      Pupils: Pupils are equal, round, and reactive to light.   Neck:      Musculoskeletal: Normal range of motion and neck supple.   Cardiovascular:      Rate and Rhythm: Normal rate and regular rhythm.      Heart sounds:  "Normal heart sounds. No murmur. No friction rub. No gallop.    Pulmonary:      Effort: Pulmonary effort is normal. No respiratory distress.      Breath sounds: Normal breath sounds. No stridor. No wheezing, rhonchi or rales.   Chest:      Chest wall: No tenderness.   Abdominal:      General: Abdomen is flat. Bowel sounds are normal. There is no distension.      Palpations: Abdomen is soft.      Tenderness: There is no abdominal tenderness.   Musculoskeletal:      Comments: The left knee is tender.   Skin:     General: Skin is warm and dry.   Neurological:      Mental Status: She is alert.           Visit Vitals  /70   Pulse 74   Ht 165.1 cm (65\")   Wt 85.9 kg (189 lb 6 oz)   LMP 07/06/2004 (Within Months)   SpO2 98%   Breastfeeding No   BMI 31.51 kg/m²     Body mass index is 31.51 kg/m².      Assessment/Plan   Diagnoses and all orders for this visit:    1. Osteoarthritis, unspecified osteoarthritis type, unspecified site (Primary)    2. Allergic rhinitis due to other allergic trigger, unspecified seasonality    Other orders  -     loratadine (Claritin) 10 MG tablet; Take 1 tablet by mouth Daily.  Dispense: 15 tablet; Refill: 1  -     methocarbamol (Robaxin) 500 MG tablet; Take 1 tablet by mouth every night at bedtime.  Dispense: 30 tablet; Refill: 1  -     naproxen (NAPROSYN) 500 MG tablet; Take 1 tablet by mouth 2 (Two) Times a Day With Meals.  **Take with food**  Dispense: 60 tablet; Refill: 0    FMLA are filled out.  Return to the clinic in 3 month/s.  Will contact with results as needed.  She isw to monitor her weight.  "

## 2021-03-09 ENCOUNTER — OFFICE VISIT (OUTPATIENT)
Dept: FAMILY MEDICINE CLINIC | Facility: CLINIC | Age: 56
End: 2021-03-09

## 2021-03-09 ENCOUNTER — LAB (OUTPATIENT)
Dept: LAB | Facility: HOSPITAL | Age: 56
End: 2021-03-09

## 2021-03-09 VITALS
WEIGHT: 185.19 LBS | DIASTOLIC BLOOD PRESSURE: 72 MMHG | BODY MASS INDEX: 30.85 KG/M2 | OXYGEN SATURATION: 98 % | HEIGHT: 65 IN | HEART RATE: 60 BPM | SYSTOLIC BLOOD PRESSURE: 124 MMHG

## 2021-03-09 DIAGNOSIS — J30.89 ALLERGIC RHINITIS DUE TO OTHER ALLERGIC TRIGGER, UNSPECIFIED SEASONALITY: Primary | ICD-10-CM

## 2021-03-09 DIAGNOSIS — M25.50 ARTHRALGIA, UNSPECIFIED JOINT: ICD-10-CM

## 2021-03-09 DIAGNOSIS — J30.89 ALLERGIC RHINITIS DUE TO OTHER ALLERGIC TRIGGER, UNSPECIFIED SEASONALITY: ICD-10-CM

## 2021-03-09 LAB
ALBUMIN SERPL-MCNC: 4.5 G/DL (ref 3.5–5.2)
ALBUMIN/GLOB SERPL: 1.4 G/DL
ALP SERPL-CCNC: 93 U/L (ref 39–117)
ALT SERPL W P-5'-P-CCNC: 8 U/L (ref 1–33)
ANION GAP SERPL CALCULATED.3IONS-SCNC: 9.5 MMOL/L (ref 5–15)
AST SERPL-CCNC: 19 U/L (ref 1–32)
BASOPHILS # BLD AUTO: 0.05 10*3/MM3 (ref 0–0.2)
BASOPHILS NFR BLD AUTO: 0.8 % (ref 0–1.5)
BILIRUB SERPL-MCNC: 0.4 MG/DL (ref 0–1.2)
BUN SERPL-MCNC: 18 MG/DL (ref 6–20)
BUN/CREAT SERPL: 26.9 (ref 7–25)
CALCIUM SPEC-SCNC: 9.2 MG/DL (ref 8.6–10.5)
CHLORIDE SERPL-SCNC: 105 MMOL/L (ref 98–107)
CO2 SERPL-SCNC: 24.5 MMOL/L (ref 22–29)
CREAT SERPL-MCNC: 0.67 MG/DL (ref 0.57–1)
DEPRECATED RDW RBC AUTO: 42.7 FL (ref 37–54)
EOSINOPHIL # BLD AUTO: 0.35 10*3/MM3 (ref 0–0.4)
EOSINOPHIL NFR BLD AUTO: 5.4 % (ref 0.3–6.2)
ERYTHROCYTE [DISTWIDTH] IN BLOOD BY AUTOMATED COUNT: 13.1 % (ref 12.3–15.4)
GFR SERPL CREATININE-BSD FRML MDRD: 91 ML/MIN/1.73
GLOBULIN UR ELPH-MCNC: 3.2 GM/DL
GLUCOSE SERPL-MCNC: 85 MG/DL (ref 65–99)
HCT VFR BLD AUTO: 35.5 % (ref 34–46.6)
HGB BLD-MCNC: 12.1 G/DL (ref 12–15.9)
IMM GRANULOCYTES # BLD AUTO: 0.02 10*3/MM3 (ref 0–0.05)
IMM GRANULOCYTES NFR BLD AUTO: 0.3 % (ref 0–0.5)
LYMPHOCYTES # BLD AUTO: 1.76 10*3/MM3 (ref 0.7–3.1)
LYMPHOCYTES NFR BLD AUTO: 27.2 % (ref 19.6–45.3)
MCH RBC QN AUTO: 30.7 PG (ref 26.6–33)
MCHC RBC AUTO-ENTMCNC: 34.1 G/DL (ref 31.5–35.7)
MCV RBC AUTO: 90.1 FL (ref 79–97)
MONOCYTES # BLD AUTO: 0.59 10*3/MM3 (ref 0.1–0.9)
MONOCYTES NFR BLD AUTO: 9.1 % (ref 5–12)
NEUTROPHILS NFR BLD AUTO: 3.7 10*3/MM3 (ref 1.7–7)
NEUTROPHILS NFR BLD AUTO: 57.2 % (ref 42.7–76)
NRBC BLD AUTO-RTO: 0 /100 WBC (ref 0–0.2)
PLATELET # BLD AUTO: 148 10*3/MM3 (ref 140–450)
PMV BLD AUTO: 12.5 FL (ref 6–12)
POTASSIUM SERPL-SCNC: 4.6 MMOL/L (ref 3.5–5.2)
PROT SERPL-MCNC: 7.7 G/DL (ref 6–8.5)
RBC # BLD AUTO: 3.94 10*6/MM3 (ref 3.77–5.28)
SODIUM SERPL-SCNC: 139 MMOL/L (ref 136–145)
WBC # BLD AUTO: 6.47 10*3/MM3 (ref 3.4–10.8)

## 2021-03-09 PROCEDURE — 85025 COMPLETE CBC W/AUTO DIFF WBC: CPT

## 2021-03-09 PROCEDURE — 80053 COMPREHEN METABOLIC PANEL: CPT

## 2021-03-09 PROCEDURE — 99214 OFFICE O/P EST MOD 30 MIN: CPT | Performed by: FAMILY MEDICINE

## 2021-03-09 PROCEDURE — 36415 COLL VENOUS BLD VENIPUNCTURE: CPT

## 2021-03-09 RX ORDER — LORATADINE 10 MG/1
10 TABLET ORAL DAILY
Qty: 15 TABLET | Refills: 5 | Status: SHIPPED | OUTPATIENT
Start: 2021-03-09

## 2021-03-09 RX ORDER — METHOCARBAMOL 500 MG/1
500 TABLET, FILM COATED ORAL
Qty: 30 TABLET | Refills: 5 | Status: SHIPPED | OUTPATIENT
Start: 2021-03-09

## 2021-03-09 RX ORDER — NAPROXEN 500 MG/1
500 TABLET ORAL 2 TIMES DAILY WITH MEALS
Qty: 60 TABLET | Refills: 5 | Status: SHIPPED | OUTPATIENT
Start: 2021-03-09 | End: 2022-03-04 | Stop reason: SDUPTHER

## 2021-03-09 NOTE — PROGRESS NOTES
Subjective   Jefferson Pena is a 56 y.o. female.   Cc: follow up of chronic medical issues    HPI  Allergic rhinitis-her sneezing and rhinorrhea have worsened slightly. Her symptoms are stable.  Arthralgia- Her left knee is at it's base line. She says that the knee pain comes and goes.  \  The following portions of the patient's history were reviewed and updated as appropriate: allergies, current medications, past family history, past medical history, past social history, past surgical history and problem list.    Review of Systems   Constitutional: Negative for fatigue and fever.   Respiratory: Negative for cough and chest tightness.    Cardiovascular: Negative for chest pain, palpitations and leg swelling.   Gastrointestinal: Negative for abdominal distention and abdominal pain.   Musculoskeletal: Positive for arthralgias and joint swelling. Negative for back pain and gait problem.       Objective   Physical Exam  Vitals reviewed.   Constitutional:       Appearance: Normal appearance.   HENT:      Head: Normocephalic and atraumatic.      Right Ear: Tympanic membrane, ear canal and external ear normal.      Left Ear: Tympanic membrane, ear canal and external ear normal.      Nose: Nose normal.      Mouth/Throat:      Mouth: Mucous membranes are moist.      Pharynx: Oropharynx is clear.   Eyes:      Extraocular Movements: Extraocular movements intact.      Pupils: Pupils are equal, round, and reactive to light.   Cardiovascular:      Rate and Rhythm: Normal rate and regular rhythm.      Heart sounds: Normal heart sounds. No murmur. No friction rub. No gallop.    Pulmonary:      Effort: Pulmonary effort is normal. No respiratory distress.      Breath sounds: Normal breath sounds. No stridor. No wheezing, rhonchi or rales.   Abdominal:      General: Abdomen is flat. There is no distension.      Palpations: Abdomen is soft.      Tenderness: There is no abdominal tenderness. There is no guarding.   Musculoskeletal:      " Cervical back: Normal range of motion and neck supple.      Comments: She has tenderness on palpation of the left knee.   Skin:     General: Skin is warm and dry.   Neurological:      Mental Status: She is alert.           Visit Vitals  /72   Pulse 60   Ht 165.1 cm (65\")   Wt 84 kg (185 lb 3 oz)   LMP 07/06/2004 (Within Months)   SpO2 98%   Breastfeeding No   BMI 30.82 kg/m²     Body mass index is 30.82 kg/m².      Assessment/Plan   Diagnoses and all orders for this visit:    1. Allergic rhinitis due to other allergic trigger, unspecified seasonality (Primary)  -     CBC w AUTO Differential; Future  -     Comprehensive metabolic panel; Future    2. Arthralgia, unspecified joint    Other orders  -     loratadine (Claritin) 10 MG tablet; Take 1 tablet by mouth Daily.  Dispense: 15 tablet; Refill: 5  -     methocarbamol (Robaxin) 500 MG tablet; Take 1 tablet by mouth every night at bedtime.  Dispense: 30 tablet; Refill: 5  -     naproxen (NAPROSYN) 500 MG tablet; Take 1 tablet by mouth 2 (two) times a day. **Take with food**  Dispense: 60 tablet; Refill: 5    Return to the clinic in 3 month/s.  Will contact with results as needed.    "

## 2021-05-21 ENCOUNTER — IMMUNIZATION (OUTPATIENT)
Dept: VACCINE CLINIC | Facility: HOSPITAL | Age: 56
End: 2021-05-21

## 2021-05-21 PROCEDURE — 0001A: CPT | Performed by: THORACIC SURGERY (CARDIOTHORACIC VASCULAR SURGERY)

## 2021-05-21 PROCEDURE — 91300 HC SARSCOV02 VAC 30MCG/0.3ML IM: CPT | Performed by: THORACIC SURGERY (CARDIOTHORACIC VASCULAR SURGERY)

## 2021-05-26 ENCOUNTER — OFFICE VISIT (OUTPATIENT)
Dept: FAMILY MEDICINE CLINIC | Facility: CLINIC | Age: 56
End: 2021-05-26

## 2021-05-26 VITALS
BODY MASS INDEX: 32.15 KG/M2 | OXYGEN SATURATION: 98 % | DIASTOLIC BLOOD PRESSURE: 70 MMHG | WEIGHT: 193 LBS | HEIGHT: 65 IN | HEART RATE: 67 BPM | SYSTOLIC BLOOD PRESSURE: 128 MMHG

## 2021-05-26 DIAGNOSIS — J30.9 ALLERGIC RHINITIS, UNSPECIFIED SEASONALITY, UNSPECIFIED TRIGGER: ICD-10-CM

## 2021-05-26 DIAGNOSIS — M25.562 ARTHRALGIA OF BOTH KNEES: Primary | ICD-10-CM

## 2021-05-26 DIAGNOSIS — M25.561 ARTHRALGIA OF BOTH KNEES: Primary | ICD-10-CM

## 2021-05-26 PROCEDURE — 99214 OFFICE O/P EST MOD 30 MIN: CPT | Performed by: FAMILY MEDICINE

## 2021-05-26 NOTE — PROGRESS NOTES
Subjective   Jefferson Pena is a 56 y.o. female.   .Cc: follow up of chronic medical issues    HPI  Allergic Rhinitis- The patient is still having nasal congestion,itchy watery eyes and postnasal drip.She is at her base line.  Arthralgia- She hurts in her knees off and on.  The following portions of the patient's history were reviewed and updated as appropriate: allergies, current medications, past family history, past medical history, past social history, past surgical history and problem list.    Review of Systems   Constitutional: Negative for fatigue and fever.   Respiratory: Negative for cough, chest tightness and stridor.    Cardiovascular: Negative for chest pain, palpitations and leg swelling.       Objective   Physical Exam  Vitals reviewed.   Constitutional:       Appearance: Normal appearance.   HENT:      Head: Normocephalic and atraumatic.      Right Ear: Tympanic membrane, ear canal and external ear normal.      Left Ear: Tympanic membrane, ear canal and external ear normal.      Nose: Congestion present.      Mouth/Throat:      Mouth: Mucous membranes are moist.      Pharynx: Oropharynx is clear.   Cardiovascular:      Rate and Rhythm: Normal rate and regular rhythm.      Heart sounds: Normal heart sounds. No murmur heard.   No gallop.    Pulmonary:      Effort: Pulmonary effort is normal. No respiratory distress.      Breath sounds: Normal breath sounds. No stridor. No wheezing, rhonchi or rales.   Chest:      Chest wall: No tenderness.   Abdominal:      General: Abdomen is flat. Bowel sounds are normal. There is no distension.      Palpations: Abdomen is soft. There is no mass.      Tenderness: There is no abdominal tenderness. There is no guarding or rebound.      Hernia: No hernia is present.   Musculoskeletal:      Cervical back: Normal range of motion.      Comments: The knees are tender on palpation.   Skin:     General: Skin is warm and dry.   Neurological:      Mental Status: She is alert.  "          Visit Vitals  /70   Pulse 67   Ht 165.1 cm (65\")   Wt 87.5 kg (193 lb)   LMP 07/06/2004 (Within Months)   SpO2 98%   Breastfeeding No   BMI 32.12 kg/m²     Body mass index is 32.12 kg/m².      Assessment/Plan   Diagnoses and all orders for this visit:    1. Arthralgia of both knees (Primary)    2. Allergic rhinitis, unspecified seasonality, unspecified trigger  -     Comprehensive metabolic panel; Future  -     CBC w AUTO Differential; Future    Return to the clinic in 6 month/s.  Will contact with results as needed.    "

## 2021-06-11 ENCOUNTER — IMMUNIZATION (OUTPATIENT)
Dept: VACCINE CLINIC | Facility: HOSPITAL | Age: 56
End: 2021-06-11

## 2021-06-11 PROCEDURE — 91300 HC SARSCOV02 VAC 30MCG/0.3ML IM: CPT | Performed by: THORACIC SURGERY (CARDIOTHORACIC VASCULAR SURGERY)

## 2021-06-11 PROCEDURE — 0002A: CPT | Performed by: THORACIC SURGERY (CARDIOTHORACIC VASCULAR SURGERY)

## 2021-11-04 ENCOUNTER — TELEPHONE (OUTPATIENT)
Dept: OBSTETRICS AND GYNECOLOGY | Facility: CLINIC | Age: 56
End: 2021-11-04

## 2021-11-04 DIAGNOSIS — Z12.31 ENCOUNTER FOR SCREENING MAMMOGRAM FOR MALIGNANT NEOPLASM OF BREAST: Primary | ICD-10-CM

## 2021-11-04 NOTE — TELEPHONE ENCOUNTER
I CALLED THE PATIENT TO LET HER KNOW ABOUT HER MAMMOGRAM APPOINTMENT. I DIDN'T GET AN ANSWER AND COULDN'T LEAVE A MESSAGE.   
22-Nov-2019 15:05:04

## 2021-11-24 ENCOUNTER — OFFICE VISIT (OUTPATIENT)
Dept: OBSTETRICS AND GYNECOLOGY | Facility: CLINIC | Age: 56
End: 2021-11-24

## 2021-11-24 VITALS
BODY MASS INDEX: 32.65 KG/M2 | WEIGHT: 196 LBS | DIASTOLIC BLOOD PRESSURE: 86 MMHG | HEIGHT: 65 IN | SYSTOLIC BLOOD PRESSURE: 120 MMHG

## 2021-11-24 DIAGNOSIS — Z12.31 BREAST CANCER SCREENING BY MAMMOGRAM: Primary | ICD-10-CM

## 2021-11-24 PROCEDURE — 99212 OFFICE O/P EST SF 10 MIN: CPT | Performed by: OBSTETRICS & GYNECOLOGY

## 2021-11-25 NOTE — PROGRESS NOTES
Chief complaint here for annual examination    Patient presented requesting annual examination but she has had a hysterectomy in the past with no history of abnormal Pap smears.  I discussed with her not recommended at this point to continue the cervical cytology.  Would recommend yearly mammograms and she was already set up for this today.   I spent 8 minutes making  more than 50% of this encounter, being spent in counseling and coordination of care.

## 2022-03-04 ENCOUNTER — OFFICE VISIT (OUTPATIENT)
Dept: FAMILY MEDICINE CLINIC | Facility: CLINIC | Age: 57
End: 2022-03-04

## 2022-03-04 ENCOUNTER — LAB (OUTPATIENT)
Dept: LAB | Facility: HOSPITAL | Age: 57
End: 2022-03-04

## 2022-03-04 VITALS
OXYGEN SATURATION: 99 % | WEIGHT: 195.8 LBS | HEART RATE: 70 BPM | BODY MASS INDEX: 32.62 KG/M2 | DIASTOLIC BLOOD PRESSURE: 76 MMHG | SYSTOLIC BLOOD PRESSURE: 124 MMHG | HEIGHT: 65 IN

## 2022-03-04 DIAGNOSIS — J30.9 ALLERGIC RHINITIS, UNSPECIFIED SEASONALITY, UNSPECIFIED TRIGGER: ICD-10-CM

## 2022-03-04 DIAGNOSIS — M54.9 BACK PAIN, UNSPECIFIED BACK LOCATION, UNSPECIFIED BACK PAIN LATERALITY, UNSPECIFIED CHRONICITY: ICD-10-CM

## 2022-03-04 DIAGNOSIS — M54.9 BACK PAIN, UNSPECIFIED BACK LOCATION, UNSPECIFIED BACK PAIN LATERALITY, UNSPECIFIED CHRONICITY: Primary | ICD-10-CM

## 2022-03-04 LAB
ALBUMIN SERPL-MCNC: 4.6 G/DL (ref 3.5–5.2)
ALBUMIN/GLOB SERPL: 1.5 G/DL
ALP SERPL-CCNC: 110 U/L (ref 39–117)
ALT SERPL W P-5'-P-CCNC: 20 U/L (ref 1–33)
ANION GAP SERPL CALCULATED.3IONS-SCNC: 10.2 MMOL/L (ref 5–15)
AST SERPL-CCNC: 25 U/L (ref 1–32)
BILIRUB SERPL-MCNC: 0.3 MG/DL (ref 0–1.2)
BUN SERPL-MCNC: 17 MG/DL (ref 6–20)
BUN/CREAT SERPL: 23.6 (ref 7–25)
CALCIUM SPEC-SCNC: 9.6 MG/DL (ref 8.6–10.5)
CHLORIDE SERPL-SCNC: 106 MMOL/L (ref 98–107)
CO2 SERPL-SCNC: 24.8 MMOL/L (ref 22–29)
CREAT SERPL-MCNC: 0.72 MG/DL (ref 0.57–1)
EGFRCR SERPLBLD CKD-EPI 2021: 98.3 ML/MIN/1.73
GLOBULIN UR ELPH-MCNC: 3 GM/DL
GLUCOSE SERPL-MCNC: 81 MG/DL (ref 65–99)
POTASSIUM SERPL-SCNC: 4.7 MMOL/L (ref 3.5–5.2)
PROT SERPL-MCNC: 7.6 G/DL (ref 6–8.5)
SODIUM SERPL-SCNC: 141 MMOL/L (ref 136–145)

## 2022-03-04 PROCEDURE — 99214 OFFICE O/P EST MOD 30 MIN: CPT | Performed by: FAMILY MEDICINE

## 2022-03-04 PROCEDURE — 85025 COMPLETE CBC W/AUTO DIFF WBC: CPT

## 2022-03-04 PROCEDURE — 80053 COMPREHEN METABOLIC PANEL: CPT

## 2022-03-04 PROCEDURE — 36415 COLL VENOUS BLD VENIPUNCTURE: CPT

## 2022-03-04 RX ORDER — NAPROXEN 500 MG/1
500 TABLET ORAL 2 TIMES DAILY WITH MEALS
Qty: 60 TABLET | Refills: 5 | Status: SHIPPED | OUTPATIENT
Start: 2022-03-04

## 2022-03-04 NOTE — PROGRESS NOTES
Subjective   Jefferson Pena is a 56 y.o. female.   Cc: follow up of chronic medical issues    Osteoarthritis  Presents for follow-up visit. She complains of stiffness. She reports no pain, joint swelling or joint warmth. Affected locations include the left knee and right knee. Her pain is at a severity of 5/10. Pertinent negatives include no diarrhea, dry eyes, dry mouth, dysuria, fatigue, fever, pain at night, pain while resting, rash, Raynaud's syndrome, uveitis or weight loss. Her past medical history is significant for osteoarthritis.   Allergic rhinitis symptoms are at it's base line.    The following portions of the patient's history were reviewed and updated as appropriate: allergies, current medications, past family history, past medical history, past social history, past surgical history and problem list.    Review of Systems   Constitutional: Negative for fatigue, fever and weight loss.   Gastrointestinal: Negative for diarrhea.   Genitourinary: Negative for dysuria.   Musculoskeletal: Positive for stiffness. Negative for joint swelling.   Skin: Negative for rash.       Objective   Physical Exam  Vitals reviewed.   Constitutional:       Appearance: Normal appearance.   HENT:      Head: Normocephalic and atraumatic.      Right Ear: Tympanic membrane, ear canal and external ear normal.      Left Ear: Tympanic membrane, ear canal and external ear normal.      Nose: Nose normal.      Mouth/Throat:      Mouth: Mucous membranes are moist.      Pharynx: Oropharynx is clear.   Cardiovascular:      Rate and Rhythm: Normal rate and regular rhythm.      Heart sounds: Normal heart sounds. No murmur heard.  No friction rub. No gallop.    Pulmonary:      Effort: Pulmonary effort is normal. No respiratory distress.      Breath sounds: Normal breath sounds. No stridor. No wheezing, rhonchi or rales.   Chest:      Chest wall: No tenderness.   Abdominal:      General: Abdomen is flat. Bowel sounds are normal. There is no  "distension.      Palpations: Abdomen is soft. There is no mass.      Tenderness: There is no abdominal tenderness. There is no guarding or rebound.      Hernia: No hernia is present.   Musculoskeletal:      Cervical back: Normal range of motion and neck supple.      Comments: The low back and knees are mildly tender.   Skin:     General: Skin is warm and dry.   Neurological:      Mental Status: She is alert.           Visit Vitals  /76   Pulse 70   Ht 165.1 cm (65\")   Wt 88.8 kg (195 lb 12.8 oz)   LMP 07/06/2004 (Within Months)   SpO2 99%   BMI 32.58 kg/m²     Body mass index is 32.58 kg/m².      Assessment/Plan   Diagnoses and all orders for this visit:    1. Back pain, unspecified back location, unspecified back pain laterality, unspecified chronicity (Primary)  -     Comprehensive metabolic panel; Future  -     CBC w AUTO Differential; Future    2. Allergic rhinitis, unspecified seasonality, unspecified trigger    Other orders  -     naproxen (NAPROSYN) 500 MG tablet; Take 1 tablet by mouth 2 (two) times a day with meals.  Dispense: 60 tablet; Refill: 5    I reviewed her CBC and CMP with the patient.  Return to the clinic in 6 month/s.  Will contact with results as needed.    "

## 2022-03-05 LAB
BASOPHILS # BLD AUTO: 0.04 10*3/MM3 (ref 0–0.2)
BASOPHILS NFR BLD AUTO: 0.5 % (ref 0–1.5)
DEPRECATED RDW RBC AUTO: 46.8 FL (ref 37–54)
EOSINOPHIL # BLD AUTO: 0.46 10*3/MM3 (ref 0–0.4)
EOSINOPHIL NFR BLD AUTO: 6.1 % (ref 0.3–6.2)
ERYTHROCYTE [DISTWIDTH] IN BLOOD BY AUTOMATED COUNT: 13.6 % (ref 12.3–15.4)
HCT VFR BLD AUTO: 37.4 % (ref 34–46.6)
HGB BLD-MCNC: 12.4 G/DL (ref 12–15.9)
IMM GRANULOCYTES # BLD AUTO: 0.03 10*3/MM3 (ref 0–0.05)
IMM GRANULOCYTES NFR BLD AUTO: 0.4 % (ref 0–0.5)
LYMPHOCYTES # BLD AUTO: 1.63 10*3/MM3 (ref 0.7–3.1)
LYMPHOCYTES NFR BLD AUTO: 21.7 % (ref 19.6–45.3)
MCH RBC QN AUTO: 31.1 PG (ref 26.6–33)
MCHC RBC AUTO-ENTMCNC: 33.2 G/DL (ref 31.5–35.7)
MCV RBC AUTO: 93.7 FL (ref 79–97)
MONOCYTES # BLD AUTO: 0.56 10*3/MM3 (ref 0.1–0.9)
MONOCYTES NFR BLD AUTO: 7.5 % (ref 5–12)
NEUTROPHILS NFR BLD AUTO: 4.78 10*3/MM3 (ref 1.7–7)
NEUTROPHILS NFR BLD AUTO: 63.8 % (ref 42.7–76)
NRBC BLD AUTO-RTO: 0 /100 WBC (ref 0–0.2)
PLATELET # BLD AUTO: 187 10*3/MM3 (ref 140–450)
PMV BLD AUTO: 12.8 FL (ref 6–12)
RBC # BLD AUTO: 3.99 10*6/MM3 (ref 3.77–5.28)
WBC NRBC COR # BLD: 7.5 10*3/MM3 (ref 3.4–10.8)

## 2022-10-10 ENCOUNTER — OFFICE VISIT (OUTPATIENT)
Dept: FAMILY MEDICINE CLINIC | Facility: CLINIC | Age: 57
End: 2022-10-10

## 2022-10-10 VITALS
HEART RATE: 73 BPM | DIASTOLIC BLOOD PRESSURE: 76 MMHG | OXYGEN SATURATION: 97 % | WEIGHT: 190.56 LBS | BODY MASS INDEX: 31.75 KG/M2 | SYSTOLIC BLOOD PRESSURE: 132 MMHG | HEIGHT: 65 IN

## 2022-10-10 DIAGNOSIS — Z12.11 SCREEN FOR COLON CANCER: ICD-10-CM

## 2022-10-10 DIAGNOSIS — J30.9 ALLERGIC RHINITIS, UNSPECIFIED SEASONALITY, UNSPECIFIED TRIGGER: ICD-10-CM

## 2022-10-10 DIAGNOSIS — M54.9 BACK PAIN, UNSPECIFIED BACK LOCATION, UNSPECIFIED BACK PAIN LATERALITY, UNSPECIFIED CHRONICITY: Primary | ICD-10-CM

## 2022-10-10 PROBLEM — E66.9 OBESITY (BMI 30.0-34.9): Status: ACTIVE | Noted: 2022-10-10

## 2022-10-10 PROCEDURE — 99214 OFFICE O/P EST MOD 30 MIN: CPT | Performed by: FAMILY MEDICINE

## 2022-10-10 NOTE — PROGRESS NOTES
Arin Pena is a 57 y.o. female.   Cc: follow up of chronic medical issues    Back Pain  This is a chronic problem. The current episode started more than 1 year ago. The problem occurs daily. The pain is present in the lumbar spine. The quality of the pain is described as aching. The pain is at a severity of 5/10. The pain is moderate. The symptoms are aggravated by bending, standing and twisting. Pertinent negatives include no abdominal pain, bladder incontinence, bowel incontinence, chest pain, dysuria, fever, headaches, leg pain, numbness, weakness or weight loss.   She has a history of Allergic Rhinitis. It is at it's base line.    The following portions of the patient's history were reviewed and updated as appropriate: allergies, current medications, past family history, past medical history, past social history, past surgical history and problem list.    Review of Systems   Constitutional: Negative for fever and weight loss.   Cardiovascular: Negative for chest pain.   Gastrointestinal: Negative for abdominal pain and bowel incontinence.   Genitourinary: Negative for bladder incontinence and dysuria.   Musculoskeletal: Positive for back pain.   Neurological: Negative for weakness, numbness and headaches.       Objective   Physical Exam  Vitals reviewed.   Constitutional:       Appearance: Normal appearance.   HENT:      Head: Normocephalic and atraumatic.      Right Ear: Tympanic membrane, ear canal and external ear normal.      Left Ear: Tympanic membrane, ear canal and external ear normal.      Nose: Nose normal.      Mouth/Throat:      Mouth: Mucous membranes are moist.      Pharynx: Oropharynx is clear.   Cardiovascular:      Rate and Rhythm: Normal rate and regular rhythm.      Heart sounds: Normal heart sounds. No murmur heard.    No friction rub. No gallop.   Pulmonary:      Effort: Pulmonary effort is normal. No respiratory distress.      Breath sounds: Normal breath sounds. No  "stridor. No wheezing, rhonchi or rales.   Chest:      Chest wall: No tenderness.   Abdominal:      General: Bowel sounds are normal. There is no distension.      Palpations: Abdomen is soft. There is no mass.      Tenderness: There is no abdominal tenderness. There is no guarding or rebound.      Hernia: No hernia is present.   Musculoskeletal:      Cervical back: Normal range of motion.      Comments: Her low back is mildly tender.   Skin:     General: Skin is warm and dry.   Neurological:      Mental Status: She is alert.           Visit Vitals  /76   Pulse 73   Ht 165.1 cm (65\")   Wt 86.4 kg (190 lb 9 oz)   LMP 07/06/2004 (Within Months)   SpO2 97%   BMI 31.71 kg/m²     Body mass index is 31.71 kg/m².      Assessment/Plan   Diagnoses and all orders for this visit:    1. Back pain, unspecified back location, unspecified back pain laterality, unspecified chronicity (Primary)    2. Allergic rhinitis, unspecified seasonality, unspecified trigger    3. Screen for colon cancer  -     Cologuard - Stool, Per Rectum; Future    I reviewed her CBC and CMP with the patient.  Return to the clinic in 3 month/s.  Will contact with results as needed.          This document has been electronically signed by Andrew Perez MD on October 10, 2022 12:06 CDT    "

## 2023-03-31 ENCOUNTER — OFFICE VISIT (OUTPATIENT)
Dept: FAMILY MEDICINE CLINIC | Facility: CLINIC | Age: 58
End: 2023-03-31
Payer: COMMERCIAL

## 2023-03-31 VITALS
DIASTOLIC BLOOD PRESSURE: 86 MMHG | BODY MASS INDEX: 30.67 KG/M2 | OXYGEN SATURATION: 99 % | HEART RATE: 63 BPM | HEIGHT: 65 IN | WEIGHT: 184.1 LBS | SYSTOLIC BLOOD PRESSURE: 118 MMHG

## 2023-03-31 DIAGNOSIS — M54.9 BACK PAIN, UNSPECIFIED BACK LOCATION, UNSPECIFIED BACK PAIN LATERALITY, UNSPECIFIED CHRONICITY: Primary | ICD-10-CM

## 2023-03-31 DIAGNOSIS — J30.9 ALLERGIC RHINITIS, UNSPECIFIED SEASONALITY, UNSPECIFIED TRIGGER: ICD-10-CM

## 2023-03-31 PROCEDURE — 99214 OFFICE O/P EST MOD 30 MIN: CPT | Performed by: FAMILY MEDICINE

## 2023-03-31 NOTE — PROGRESS NOTES
Arin Pena is a 58 y.o. female.   Cc: follow up of chronic medical issues    Back Pain  This is a chronic problem. The current episode started more than 1 year ago. The problem occurs daily. The pain is present in the lumbar spine. The pain is at a severity of 5/10. The pain is moderate. The pain is worse during the day. The symptoms are aggravated by bending and standing. Pertinent negatives include no abdominal pain, bladder incontinence, bowel incontinence, chest pain, dysuria, fever, headaches, leg pain, numbness, tingling, weakness or weight loss.   Marjorie has a history of Allergic Rhinitis. She is at her base line.    The following portions of the patient's history were reviewed and updated as appropriate: allergies, current medications, past family history, past medical history, past social history, past surgical history and problem list.    Review of Systems   Constitutional: Negative for fever and weight loss.   Cardiovascular: Negative for chest pain.   Gastrointestinal: Negative for abdominal pain and bowel incontinence.   Genitourinary: Negative for bladder incontinence and dysuria.   Musculoskeletal: Positive for back pain.   Neurological: Negative for tingling, weakness, numbness and headaches.       Objective   Physical Exam  Vitals reviewed.   Constitutional:       Appearance: Normal appearance.   HENT:      Head: Normocephalic and atraumatic.      Right Ear: Tympanic membrane, ear canal and external ear normal.      Left Ear: Tympanic membrane, ear canal and external ear normal.      Nose: Nose normal.      Mouth/Throat:      Mouth: Mucous membranes are moist.      Pharynx: Oropharynx is clear.   Cardiovascular:      Rate and Rhythm: Normal rate and regular rhythm.      Heart sounds: Normal heart sounds. No murmur heard.    No friction rub. No gallop.   Pulmonary:      Effort: Pulmonary effort is normal. No respiratory distress.      Breath sounds: Normal breath sounds. No stridor.  "No wheezing, rhonchi or rales.   Chest:      Chest wall: No tenderness.   Abdominal:      General: Bowel sounds are normal. There is no distension.      Palpations: Abdomen is soft. There is no mass.      Tenderness: There is no abdominal tenderness. There is no guarding or rebound.      Hernia: No hernia is present.   Musculoskeletal:      Cervical back: Normal range of motion and neck supple.      Comments: The low back is non tender.   Skin:     General: Skin is warm and dry.   Neurological:      Mental Status: She is alert.           Visit Vitals  /86   Pulse 63   Ht 165.1 cm (65\")   Wt 83.5 kg (184 lb 1.6 oz)   LMP 07/06/2004 (Within Months)   SpO2 99%   BMI 30.64 kg/m²     Body mass index is 30.64 kg/m².      Assessment/Plan   Diagnoses and all orders for this visit:    1. Back pain, unspecified back location, unspecified back pain laterality, unspecified chronicity (Primary)  -     Comprehensive metabolic panel; Future  -     CBC w AUTO Differential; Future    2. Allergic rhinitis, unspecified seasonality, unspecified trigger    I reviewed her CBC and CMP (03/04/22) with the patient.  Return to the clinic in 3 month/s.  Will contact with results as needed.  Continue with current medications for Back Pain and Allergic Rhinitis.. Back pain and Allergic Rhinitis are stable.        This document has been electronically signed by Andrew Perez MD on March 31, 2023 09:24 CDT    "

## 2023-04-21 ENCOUNTER — LAB (OUTPATIENT)
Dept: LAB | Facility: HOSPITAL | Age: 58
End: 2023-04-21
Payer: COMMERCIAL

## 2023-04-21 DIAGNOSIS — M54.9 BACK PAIN, UNSPECIFIED BACK LOCATION, UNSPECIFIED BACK PAIN LATERALITY, UNSPECIFIED CHRONICITY: ICD-10-CM

## 2023-04-21 PROCEDURE — 36415 COLL VENOUS BLD VENIPUNCTURE: CPT

## 2023-04-21 PROCEDURE — 85025 COMPLETE CBC W/AUTO DIFF WBC: CPT

## 2023-04-21 PROCEDURE — 80053 COMPREHEN METABOLIC PANEL: CPT

## 2023-04-22 LAB
ALBUMIN SERPL-MCNC: 4.5 G/DL (ref 3.5–5.2)
ALBUMIN/GLOB SERPL: 1.6 G/DL
ALP SERPL-CCNC: 116 U/L (ref 39–117)
ALT SERPL W P-5'-P-CCNC: 11 U/L (ref 1–33)
ANION GAP SERPL CALCULATED.3IONS-SCNC: 10 MMOL/L (ref 5–15)
AST SERPL-CCNC: 26 U/L (ref 1–32)
BASOPHILS # BLD AUTO: 0.03 10*3/MM3 (ref 0–0.2)
BASOPHILS NFR BLD AUTO: 0.4 % (ref 0–1.5)
BILIRUB SERPL-MCNC: 0.4 MG/DL (ref 0–1.2)
BUN SERPL-MCNC: 20 MG/DL (ref 6–20)
BUN/CREAT SERPL: 28.2 (ref 7–25)
CALCIUM SPEC-SCNC: 8.8 MG/DL (ref 8.6–10.5)
CHLORIDE SERPL-SCNC: 105 MMOL/L (ref 98–107)
CO2 SERPL-SCNC: 24 MMOL/L (ref 22–29)
CREAT SERPL-MCNC: 0.71 MG/DL (ref 0.57–1)
DEPRECATED RDW RBC AUTO: 48.1 FL (ref 37–54)
EGFRCR SERPLBLD CKD-EPI 2021: 98.7 ML/MIN/1.73
EOSINOPHIL # BLD AUTO: 0.3 10*3/MM3 (ref 0–0.4)
EOSINOPHIL NFR BLD AUTO: 4.4 % (ref 0.3–6.2)
ERYTHROCYTE [DISTWIDTH] IN BLOOD BY AUTOMATED COUNT: 14.3 % (ref 12.3–15.4)
GLOBULIN UR ELPH-MCNC: 2.8 GM/DL
GLUCOSE SERPL-MCNC: 91 MG/DL (ref 65–99)
HCT VFR BLD AUTO: 33.9 % (ref 34–46.6)
HGB BLD-MCNC: 11.3 G/DL (ref 12–15.9)
IMM GRANULOCYTES # BLD AUTO: 0.02 10*3/MM3 (ref 0–0.05)
IMM GRANULOCYTES NFR BLD AUTO: 0.3 % (ref 0–0.5)
LYMPHOCYTES # BLD AUTO: 1.55 10*3/MM3 (ref 0.7–3.1)
LYMPHOCYTES NFR BLD AUTO: 22.9 % (ref 19.6–45.3)
MCH RBC QN AUTO: 30.4 PG (ref 26.6–33)
MCHC RBC AUTO-ENTMCNC: 33.3 G/DL (ref 31.5–35.7)
MCV RBC AUTO: 91.1 FL (ref 79–97)
MONOCYTES # BLD AUTO: 0.55 10*3/MM3 (ref 0.1–0.9)
MONOCYTES NFR BLD AUTO: 8.1 % (ref 5–12)
NEUTROPHILS NFR BLD AUTO: 4.33 10*3/MM3 (ref 1.7–7)
NEUTROPHILS NFR BLD AUTO: 63.9 % (ref 42.7–76)
NRBC BLD AUTO-RTO: 0 /100 WBC (ref 0–0.2)
PLATELET # BLD AUTO: 181 10*3/MM3 (ref 140–450)
PMV BLD AUTO: 12.1 FL (ref 6–12)
POTASSIUM SERPL-SCNC: 4.4 MMOL/L (ref 3.5–5.2)
PROT SERPL-MCNC: 7.3 G/DL (ref 6–8.5)
RBC # BLD AUTO: 3.72 10*6/MM3 (ref 3.77–5.28)
SODIUM SERPL-SCNC: 139 MMOL/L (ref 136–145)
WBC NRBC COR # BLD: 6.78 10*3/MM3 (ref 3.4–10.8)

## 2023-04-25 ENCOUNTER — PATIENT ROUNDING (BHMG ONLY) (OUTPATIENT)
Dept: FAMILY MEDICINE CLINIC | Facility: CLINIC | Age: 58
End: 2023-04-25
Payer: COMMERCIAL

## 2023-05-24 ENCOUNTER — LAB (OUTPATIENT)
Dept: LAB | Facility: HOSPITAL | Age: 58
End: 2023-05-24
Payer: COMMERCIAL

## 2023-05-24 ENCOUNTER — OFFICE VISIT (OUTPATIENT)
Dept: FAMILY MEDICINE CLINIC | Facility: CLINIC | Age: 58
End: 2023-05-24
Payer: COMMERCIAL

## 2023-05-24 VITALS
HEART RATE: 64 BPM | OXYGEN SATURATION: 98 % | WEIGHT: 187.44 LBS | HEIGHT: 65 IN | DIASTOLIC BLOOD PRESSURE: 76 MMHG | SYSTOLIC BLOOD PRESSURE: 138 MMHG | BODY MASS INDEX: 31.23 KG/M2

## 2023-05-24 DIAGNOSIS — M54.9 BACK PAIN, UNSPECIFIED BACK LOCATION, UNSPECIFIED BACK PAIN LATERALITY, UNSPECIFIED CHRONICITY: Primary | ICD-10-CM

## 2023-05-24 DIAGNOSIS — J30.9 ALLERGIC RHINITIS, UNSPECIFIED SEASONALITY, UNSPECIFIED TRIGGER: ICD-10-CM

## 2023-05-24 DIAGNOSIS — M54.9 BACK PAIN, UNSPECIFIED BACK LOCATION, UNSPECIFIED BACK PAIN LATERALITY, UNSPECIFIED CHRONICITY: ICD-10-CM

## 2023-05-24 DIAGNOSIS — M16.11 OSTEOARTHRITIS OF RIGHT HIP, UNSPECIFIED OSTEOARTHRITIS TYPE: ICD-10-CM

## 2023-05-24 LAB
BASOPHILS # BLD AUTO: 0.02 10*3/MM3 (ref 0–0.2)
BASOPHILS NFR BLD AUTO: 0.3 % (ref 0–1.5)
DEPRECATED RDW RBC AUTO: 46.6 FL (ref 37–54)
EOSINOPHIL # BLD AUTO: 0.27 10*3/MM3 (ref 0–0.4)
EOSINOPHIL NFR BLD AUTO: 4.7 % (ref 0.3–6.2)
ERYTHROCYTE [DISTWIDTH] IN BLOOD BY AUTOMATED COUNT: 14.2 % (ref 12.3–15.4)
HCT VFR BLD AUTO: 36.4 % (ref 34–46.6)
HGB BLD-MCNC: 12.3 G/DL (ref 12–15.9)
IMM GRANULOCYTES # BLD AUTO: 0.01 10*3/MM3 (ref 0–0.05)
IMM GRANULOCYTES NFR BLD AUTO: 0.2 % (ref 0–0.5)
LYMPHOCYTES # BLD AUTO: 1.35 10*3/MM3 (ref 0.7–3.1)
LYMPHOCYTES NFR BLD AUTO: 23.4 % (ref 19.6–45.3)
MCH RBC QN AUTO: 30.5 PG (ref 26.6–33)
MCHC RBC AUTO-ENTMCNC: 33.8 G/DL (ref 31.5–35.7)
MCV RBC AUTO: 90.3 FL (ref 79–97)
MONOCYTES # BLD AUTO: 0.5 10*3/MM3 (ref 0.1–0.9)
MONOCYTES NFR BLD AUTO: 8.7 % (ref 5–12)
NEUTROPHILS NFR BLD AUTO: 3.61 10*3/MM3 (ref 1.7–7)
NEUTROPHILS NFR BLD AUTO: 62.7 % (ref 42.7–76)
NRBC BLD AUTO-RTO: 0 /100 WBC (ref 0–0.2)
PLATELET # BLD AUTO: 168 10*3/MM3 (ref 140–450)
PMV BLD AUTO: 11.7 FL (ref 6–12)
RBC # BLD AUTO: 4.03 10*6/MM3 (ref 3.77–5.28)
WBC NRBC COR # BLD: 5.76 10*3/MM3 (ref 3.4–10.8)

## 2023-05-24 PROCEDURE — 85025 COMPLETE CBC W/AUTO DIFF WBC: CPT

## 2023-05-24 PROCEDURE — 99214 OFFICE O/P EST MOD 30 MIN: CPT | Performed by: FAMILY MEDICINE

## 2023-05-24 PROCEDURE — 36415 COLL VENOUS BLD VENIPUNCTURE: CPT

## 2023-05-24 NOTE — PROGRESS NOTES
Subjective   Jefferson Pena is a 58 y.o. female.   Cc: follow up of chronic medical issues    Back Pain  This is a chronic problem. The current episode started more than 1 year ago. The problem occurs daily. The pain is present in the lumbar spine. The quality of the pain is described as aching. The pain is at a severity of 6/10. The symptoms are aggravated by bending, standing and twisting. Associated symptoms include leg pain. Pertinent negatives include no abdominal pain, bladder incontinence, bowel incontinence, dysuria, fever, weakness or weight loss.   Osteoarthritis  Presents for follow-up visit. She complains of pain and stiffness. She reports no joint swelling or joint warmth. Affected locations include the right hip. Her pain is at a severity of 6/10. Associated symptoms include pain while resting. Pertinent negatives include no diarrhea, dry eyes, dry mouth, dysuria, fatigue, fever, pain at night, rash, uveitis or weight loss. Her past medical history is significant for osteoarthritis.   She has a history of allergic rhinitis . It is at it's base line.  The following portions of the patient's history were reviewed and updated as appropriate: allergies, current medications, past family history, past medical history, past social history, past surgical history and problem list.    Review of Systems   Constitutional: Negative for fatigue, fever and weight loss.   Gastrointestinal: Negative for abdominal pain, bowel incontinence and diarrhea.   Genitourinary: Negative for bladder incontinence and dysuria.   Musculoskeletal: Positive for back pain and stiffness. Negative for joint swelling.   Skin: Negative for rash.   Neurological: Negative for weakness.       Objective   Physical Exam  Vitals reviewed.   Constitutional:       Appearance: Normal appearance.   HENT:      Head: Normocephalic and atraumatic.      Right Ear: Tympanic membrane, ear canal and external ear normal.      Left Ear: Tympanic membrane,  "ear canal and external ear normal.      Nose: Nose normal.      Mouth/Throat:      Mouth: Mucous membranes are moist.      Pharynx: Oropharynx is clear.   Cardiovascular:      Rate and Rhythm: Normal rate and regular rhythm.      Heart sounds: Normal heart sounds. No murmur heard.    No friction rub. No gallop.   Pulmonary:      Effort: Pulmonary effort is normal. No respiratory distress.      Breath sounds: Normal breath sounds. No stridor. No wheezing, rhonchi or rales.   Chest:      Chest wall: No tenderness.   Abdominal:      General: Bowel sounds are normal. There is no distension.      Palpations: Abdomen is soft. There is no mass.      Tenderness: There is no abdominal tenderness. There is no guarding or rebound.      Hernia: No hernia is present.   Musculoskeletal:      Comments: The low back mildly tender. The right hip is non tender currently.   Skin:     General: Skin is warm and dry.   Neurological:      Mental Status: She is alert.           Visit Vitals  /76   Pulse 64   Ht 165.1 cm (65\")   Wt 85 kg (187 lb 7 oz)   LMP 07/06/2004 (Within Months)   SpO2 98%   Breastfeeding No   BMI 31.19 kg/m²     Body mass index is 31.19 kg/m².      Assessment/Plan   Diagnoses and all orders for this visit:    1. Back pain, unspecified back location, unspecified back pain laterality, unspecified chronicity (Primary)  -     CBC w AUTO Differential; Future    2. Osteoarthritis of right hip, unspecified osteoarthritis type    3. Allergic rhinitis, unspecified seasonality, unspecified trigger    I reviewed her CBC and amp from (04/21/23) with the patient.THe Hgb was low . Will check this.  Return to the clinic in 3 month/s.  Will contact with results as needed.  The above medical issues are stable. Continue on current medications.          This document has been electronically signed by Andrew Perez MD on May 24, 2023 09:54 CDT    "

## 2023-08-06 ENCOUNTER — APPOINTMENT (OUTPATIENT)
Dept: GENERAL RADIOLOGY | Facility: HOSPITAL | Age: 58
End: 2023-08-06
Payer: COMMERCIAL

## 2023-08-06 ENCOUNTER — APPOINTMENT (OUTPATIENT)
Dept: ULTRASOUND IMAGING | Facility: HOSPITAL | Age: 58
End: 2023-08-06
Payer: COMMERCIAL

## 2023-08-06 ENCOUNTER — HOSPITAL ENCOUNTER (EMERGENCY)
Facility: HOSPITAL | Age: 58
Discharge: HOME | End: 2023-08-06
Attending: FAMILY MEDICINE | Admitting: EMERGENCY MEDICINE
Payer: COMMERCIAL

## 2023-08-06 VITALS
HEIGHT: 65 IN | OXYGEN SATURATION: 98 % | WEIGHT: 197 LBS | DIASTOLIC BLOOD PRESSURE: 90 MMHG | BODY MASS INDEX: 32.82 KG/M2 | HEART RATE: 80 BPM | RESPIRATION RATE: 20 BRPM | SYSTOLIC BLOOD PRESSURE: 155 MMHG | TEMPERATURE: 96.6 F

## 2023-08-06 DIAGNOSIS — M17.12 TRICOMPARTMENT OSTEOARTHRITIS OF LEFT KNEE: ICD-10-CM

## 2023-08-06 DIAGNOSIS — M25.462 EFFUSION OF LEFT KNEE: Primary | ICD-10-CM

## 2023-08-06 LAB
QT INTERVAL: 380 MS
QTC INTERVAL: 416 MS

## 2023-08-06 PROCEDURE — 73562 X-RAY EXAM OF KNEE 3: CPT

## 2023-08-06 PROCEDURE — 93005 ELECTROCARDIOGRAM TRACING: CPT | Performed by: EMERGENCY MEDICINE

## 2023-08-06 PROCEDURE — 73590 X-RAY EXAM OF LOWER LEG: CPT

## 2023-08-06 PROCEDURE — 99284 EMERGENCY DEPT VISIT MOD MDM: CPT

## 2023-08-06 PROCEDURE — 93971 EXTREMITY STUDY: CPT

## 2023-08-06 RX ORDER — HYDROCODONE BITARTRATE AND ACETAMINOPHEN 5; 325 MG/1; MG/1
1 TABLET ORAL ONCE
Status: COMPLETED | OUTPATIENT
Start: 2023-08-06 | End: 2023-08-06

## 2023-08-06 RX ADMIN — HYDROCODONE BITARTRATE AND ACETAMINOPHEN 1 TABLET: 5; 325 TABLET ORAL at 14:28

## 2023-08-06 NOTE — DISCHARGE INSTRUCTIONS
Home to rest. Wear splint while awake. Keep leg elevated while at rest. Continue your home medications including Naproxen. Follow up with Dr. García, call number provided to schedule appointment. Return for worsening symptoms such as fever or redness.

## 2023-08-06 NOTE — ED PROVIDER NOTES
Subjective   History of Present Illness  58 year old female patient presents to ER from  for swelling and increased pain in left lower leg since waking today. She has chronic pain in this extremity and has varicose veins. She reports that she takes naproxen and tylenol at home without relief of pain today. She denies SOB or dizziness. She denies known history of blood clots, has a family history. She denies fall or known injury. She is rating her pain 8/10 presently. She denies tobacco, ETOH, or illicit drug use.     Review of Systems   Constitutional: Negative.  Negative for fever.   HENT: Negative.     Eyes: Negative.    Respiratory: Negative.  Negative for shortness of breath.    Cardiovascular: Negative.    Gastrointestinal: Negative.    Endocrine: Negative.    Genitourinary: Negative.    Musculoskeletal:  Positive for myalgias.        Increase pain left leg with swelling and increased difficulty ambulating   Skin: Negative.    Allergic/Immunologic: Negative.    Neurological: Negative.  Negative for dizziness and headaches.   Hematological: Negative.    Psychiatric/Behavioral: Negative.       Past Medical History:   Diagnosis Date    Allergic rhinitis     Arthritis     Asymptomatic varicose veins of bilateral lower extremities     Cellulitis     Cough     Elevated blood pressure reading without diagnosis of hypertension     Encounter for surgical aftercare following surgery on the circulatory system     LLE Stab Phlebectomies (24) 03/25/16       H/O mammogram 07/14/2005    may represent a small cysy or a fibroadenoma,asymmetric density represent normal parenchyma    Hordeolum externum right lower eyelid     Obesity     Other mucopurulent conjunctivitis, right eye     Pain in left leg     Peripheral venous insufficiency     Upper respiratory infection     Varicose veins of left lower leg with ulcer and inflammation     Venous insufficiency     chronic) (peripheral) - Wears Compression stockings     "      Allergies   Allergen Reactions    Sulfa Antibiotics Unknown - Low Severity     Patient is unsure    Penicillins Rash       Past Surgical History:   Procedure Laterality Date    COLOSTOMY      HYSTERECTOMY      MICROAMBULATORY PHLEBECTOMY Right 6/5/2020    Procedure: lower extremity stab PHLEBECTOMies;  Surgeon: Manolo De León MD;  Location: [facility];  Service: Vascular;  Laterality: Right;    PAP SMEAR  06/03/2004    normal    SKIN BIOPSY      VASCULAR SURGERY  02/02/2016    VEIN SURGERY  03/25/2016    Left lower extremity stab phlebectomies    WRIST SURGERY  06/02/1982    excision, ganglion cyst,dorsum of the right        Family History   Problem Relation Age of Onset    Hypertension Mother     Hypertension Father     Breast cancer Maternal Grandmother     Stroke Other     Hyperlipidemia Other     Heart disease Other     Thyroid disease Other        Social History     Socioeconomic History    Marital status:    Tobacco Use    Smoking status: Never    Smokeless tobacco: Never   Vaping Use    Vaping Use: Never used   Substance and Sexual Activity    Alcohol use: No    Drug use: No    Sexual activity: Not Currently     Partners: Male           Objective   /95 (BP Location: Left arm, Patient Position: Sitting)   Pulse 88   Temp 96.6 øF (35.9 øC) (Oral)   Resp 20   Ht 165.1 cm (65\")   Wt 89.4 kg (197 lb)   LMP 07/06/2004 (Within Months)   SpO2 97%   BMI 32.78 kg/mý     Physical Exam  Vitals and nursing note reviewed.   Constitutional:       General: She is not in acute distress.     Appearance: Normal appearance. She is not ill-appearing, toxic-appearing or diaphoretic.   HENT:      Head: Normocephalic.      Nose: Nose normal.      Mouth/Throat:      Mouth: Mucous membranes are moist.   Eyes:      Pupils: Pupils are equal, round, and reactive to light.   Cardiovascular:      Rate and Rhythm: Normal rate. Rhythm irregular.      Pulses: Normal pulses.      Heart sounds: Normal heart " sounds. No murmur heard.  Pulmonary:      Effort: Pulmonary effort is normal. No respiratory distress.      Breath sounds: Normal breath sounds. No stridor. No wheezing or rhonchi.   Abdominal:      General: Bowel sounds are normal. There is no distension.      Palpations: Abdomen is soft.      Tenderness: There is no abdominal tenderness.   Musculoskeletal:         General: Swelling and tenderness present. No deformity or signs of injury. Normal range of motion.      Cervical back: Normal range of motion.      Right lower leg: No edema.      Left lower leg: No edema.      Comments: Global swelling noted to left lower extremity without erythema, deformity, or wound appreciated. Tenderness to posterior lower leg, negative Homans sign. CMS intact to BLE. Color is equal bilaterally.   Skin:     General: Skin is warm and dry.      Capillary Refill: Capillary refill takes less than 2 seconds.   Neurological:      Mental Status: She is alert and oriented to person, place, and time.   Psychiatric:         Mood and Affect: Mood normal.         Behavior: Behavior normal.         Thought Content: Thought content normal.         Judgment: Judgment normal.       ECG 12 Lead      Date/Time: 8/6/2023 2:27 PM  Performed by: Porsha Westfall APRN  Authorized by: Sean Valdez DO   Interpreted by physician  Comparison: not compared with previous ECG   Previous ECG: no previous ECG available  Rhythm: sinus rhythm  Rhythm comments: sinus arrythmia  BPM: 72  Clinical impression: non-specific ECG             ED Course  ED Course as of 08/06/23 1600   Sun Aug 06, 2023   1553 Spoke with patient about her results and plan for splint and follow up with Dr. García. She verbalizes understanding and is agreeable with plan. [HS]      ED Course User Index  [HS] Porsha Westfall APRN         XR Knee 3 View Left    Result Date: 8/6/2023  FINDINGS/    Small joint effusion.  No fracture or malalignment. Mild tricompartmental degenerative  joint disease.    XR Tibia Fibula 2 View Left    Result Date: 8/6/2023  FINDINGS/    No fracture or malalignment.  Mild tricompartmental degenerative joint disease of the knee and mild degenerative joint disease of the ankle.    US Venous Doppler Lower Extremity Left (duplex)    Result Date: 8/6/2023  TECHNIQUE: High-resolution gray scale imaging, color flow Doppler, compression were performed from the groin to the calf of the left lower extremity.  Augmentation was performed of the left common femoral vein and popliteal vein. FINDINGS: Normal color flow and compressibility, augmentation, phasicity and competency of the common femoral, femoral and popliteal veins.  There is normal compressibility of the calf trifurcation.  No evidence of deep vein thrombosis.     Normal left lower extremity venous duplex ultrasound.  No evidence of DVT.                                     Medical Decision Making  Problems Addressed:  Effusion of left knee: complicated acute illness or injury  Tricompartment osteoarthritis of left knee: complicated acute illness or injury    Amount and/or Complexity of Data Reviewed  Radiology: ordered.  ECG/medicine tests: ordered.    Risk  Prescription drug management.        Final diagnoses:   Effusion of left knee   Tricompartment osteoarthritis of left knee       ED Disposition  ED Disposition       ED Disposition   Discharge    Condition   Stable    Comment   --               Eleazar García MD  [address] [place] [address] [place] [id number]  [phone]    Schedule an appointment as soon as possible for a visit   ER follow up,         Medication List      No changes were made to your prescriptions during this visit.            Porsha Westfall, APRN  08/06/23 1600

## 2023-08-06 NOTE — Clinical Note
[facility] EMERGENCY DEPARTMENT  [address] [place] [id number]  Phone: [phone]    Jefferson Pena was seen and treated in our emergency department on 8/6/2023.  She may return to work on 08/08/2023.         Thank you for choosing [facility].    Porsha Westfall, APRN

## 2023-08-08 ENCOUNTER — OFFICE VISIT (OUTPATIENT)
Dept: ORTHOPEDIC SURGERY | Facility: CLINIC | Age: 58
End: 2023-08-08
Payer: COMMERCIAL

## 2023-08-08 VITALS — HEIGHT: 65 IN | BODY MASS INDEX: 32.78 KG/M2

## 2023-08-08 DIAGNOSIS — I87.2 VENOUS INSUFFICIENCY (CHRONIC) (PERIPHERAL): ICD-10-CM

## 2023-08-08 DIAGNOSIS — M23.92 INTERNAL DERANGEMENT OF LEFT KNEE: ICD-10-CM

## 2023-08-08 DIAGNOSIS — I10 BENIGN ESSENTIAL HTN: ICD-10-CM

## 2023-08-08 DIAGNOSIS — M25.562 ACUTE PAIN OF LEFT KNEE: Primary | ICD-10-CM

## 2023-08-08 DIAGNOSIS — M25.562 LEFT KNEE PAIN, UNSPECIFIED CHRONICITY: Primary | ICD-10-CM

## 2023-08-08 PROCEDURE — 99204 OFFICE O/P NEW MOD 45 MIN: CPT | Performed by: ORTHOPAEDIC SURGERY

## 2023-08-08 RX ORDER — MELOXICAM 15 MG/1
TABLET ORAL
Qty: 30 TABLET | Refills: 3 | Status: SHIPPED | OUTPATIENT
Start: 2023-08-08

## 2023-08-08 NOTE — PROGRESS NOTES
Jefferson Pena is a 58 y.o. female   Primary provider:  Andrew Perez MD       Chief Complaint   Patient presents with    Left Knee - Initial Evaluation, Pain       HISTORY OF PRESENT ILLNESS:  Patient states that she fell Friday morning approximately 4 days ago.  She has had a history of intermittent swelling in her legs since vein stripping surgery approximately 4 to 5 years ago.  She has baseline intermittent sharp stabbing pains.  Her pain has been worse since she has fallen.  She has pain with weightbearing.  She has been using walker for support.  No numbness or tingling.    Knee Pain   Incident onset: 4 days. The incident occurred at home. The injury mechanism was a fall. The pain is present in the left knee. The quality of the pain is described as aching (Grinding). The pain is at a severity of 9/10. The pain is moderate. The symptoms are aggravated by weight bearing and movement. She has tried NSAIDs and rest for the symptoms.      CONCURRENT MEDICAL HISTORY:    Past Medical History:   Diagnosis Date    Allergic rhinitis     Arthritis     Asymptomatic varicose veins of bilateral lower extremities     Cellulitis     Cough     Elevated blood pressure reading without diagnosis of hypertension     Encounter for surgical aftercare following surgery on the circulatory system     LLE Stab Phlebectomies (24) 03/25/16       H/O mammogram 07/14/2005    may represent a small cysy or a fibroadenoma,asymmetric density represent normal parenchyma    Hordeolum externum right lower eyelid     Obesity     Other mucopurulent conjunctivitis, right eye     Pain in left leg     Peripheral venous insufficiency     Upper respiratory infection     Varicose veins of left lower leg with ulcer and inflammation     Venous insufficiency     chronic) (peripheral) - Wears Compression stockings          Allergies   Allergen Reactions    Sulfa Antibiotics Unknown - Low Severity     Patient is unsure    Latex Unknown - High  "Severity    Penicillins Rash         Current Outpatient Medications:     loratadine (Claritin) 10 MG tablet, Take 1 tablet by mouth Daily., Disp: 15 tablet, Rfl: 5    methocarbamol (Robaxin) 500 MG tablet, Take 1 tablet by mouth every night at bedtime., Disp: 30 tablet, Rfl: 5    Multiple Vitamins-Minerals (MULTIVITAMIN WITH MINERALS) tablet tablet, Take 1 tablet by mouth Daily., Disp: , Rfl:     meloxicam (MOBIC) 15 MG tablet, Take 1 tablet by mouth Daily with food., Disp: 30 tablet, Rfl: 3    Past Surgical History:   Procedure Laterality Date    COLOSTOMY      HYSTERECTOMY      MICROAMBULATORY PHLEBECTOMY Right 6/5/2020    Procedure: lower extremity stab PHLEBECTOMies;  Surgeon: Manolo De León MD;  Location: [facility];  Service: Vascular;  Laterality: Right;    PAP SMEAR  06/03/2004    normal    SKIN BIOPSY      VASCULAR SURGERY  02/02/2016    VEIN SURGERY  03/25/2016    Left lower extremity stab phlebectomies    WRIST SURGERY  06/02/1982    excision, ganglion cyst,dorsum of the right        Family History   Problem Relation Age of Onset    Hypertension Mother     Hypertension Father     Breast cancer Maternal Grandmother     Stroke Other     Hyperlipidemia Other     Heart disease Other     Thyroid disease Other        Social History     Socioeconomic History    Marital status:    Tobacco Use    Smoking status: Never    Smokeless tobacco: Never   Vaping Use    Vaping Use: Never used   Substance and Sexual Activity    Alcohol use: No    Drug use: No    Sexual activity: Not Currently     Partners: Male        Review of Systems   Constitutional:  Negative for chills and fever.   Respiratory: Negative.     Cardiovascular: Negative.    Gastrointestinal: Negative.    Musculoskeletal:         Left knee pain   All other systems reviewed and are negative.    PHYSICAL EXAMINATION:       Ht 165.1 cm (65\")   LMP 07/06/2004 (Within Months)   BMI 32.78 kg/mý     Physical Exam  Constitutional:       General: " She is not in acute distress.     Appearance: Normal appearance.   Pulmonary:      Effort: Pulmonary effort is normal. No respiratory distress.   Neurological:      Mental Status: She is alert and oriented to person, place, and time.       GAIT:     []  Normal  [x]  Antalgic    Assistive device: []  None  [x]  Walker     []  Crutches  []  Cane     [x]  Wheelchair []  Stretcher    Left Knee Exam     Comments:  She has mild diffuse swelling.  Tender to palpation along the lateral tibial plateau.  No tenderness medially.  Good distal pulses and sensation grossly.  Tenderness with range of motion.  Good stability on exam but does have tenderness laterally with varus and valgus testing.                XR Knee 3 View Left    Result Date: 8/6/2023  Narrative: FINDINGS/    Impression: Small joint effusion.  No fracture or malalignment. Mild tricompartmental degenerative joint disease.    XR Knee Bilateral AP Standing    Result Date: 8/9/2023  Narrative: Ordering Provider:  Eleazar García MD Ordering Diagnosis/Indication:  Left knee pain, unspecified chronicity Procedure:  XR KNEE BILATERAL AP STANDING Exam Date:  8/8/23 COMPARISON:  Todays X-rays were compared to previous images dated August 6, 2023.     Impression:  AP bilateral standing of the knees are taken in comparison to supine views of the left knee from August 6.  Medial joint space narrowing in both knees of approximately 60%.  No acute bony abnormality is noted.  Mild diffuse osteopenia. Eleazar García MD 8/8/23    XR Tibia Fibula 2 View Left    Result Date: 8/6/2023  Narrative: FINDINGS/    Impression: No fracture or malalignment.  Mild tricompartmental degenerative joint disease of the knee and mild degenerative joint disease of the ankle.    US Venous Doppler Lower Extremity Left (duplex)    Result Date: 8/6/2023  Narrative: TECHNIQUE: High-resolution gray scale imaging, color flow Doppler, compression were performed from the groin to the calf  of the left lower extremity.  Augmentation was performed of the left common femoral vein and popliteal vein. FINDINGS: Normal color flow and compressibility, augmentation, phasicity and competency of the common femoral, femoral and popliteal veins.  There is normal compressibility of the calf trifurcation.  No evidence of deep vein thrombosis.     Impression: Normal left lower extremity venous duplex ultrasound.  No evidence of DVT.         ASSESSMENT:    Diagnoses and all orders for this visit:    Acute pain of left knee  -     MRI Knee Left Without Contrast; Future    Internal derangement of left knee  -     MRI Knee Left Without Contrast; Future    Benign essential HTN  -     MRI Knee Left Without Contrast; Future    Venous insufficiency (chronic) (peripheral)  -     MRI Knee Left Without Contrast; Future    Other orders  -     meloxicam (MOBIC) 15 MG tablet; Take 1 tablet by mouth Daily with food.          PLAN    The patient had a fall 4 days ago and had negative x-rays.  She continues having significant pain with weightbearing and is tender laterally along the tibial plateau.  She has difficult time with activities of daily living and has not been able to progress.  She will continue using a walker for weightbearing support.  We discussed changing Naprosyn to meloxicam.  Proceed with MRI to assess for occult fracture and help direct further treatment options.      Return for recheck for MRI results.    Eleazar García MD

## 2023-08-08 NOTE — LETTER
August 8, 2023     Patient: Jefferson Pena   YOB: 1965   Date of Visit: 8/8/2023       To Whom it May Concern:    Jefferson Pena was seen in my clinic on 8/8/2023. She is to be off work until cleared by Orthopedics.    If you have any questions or concerns, please don't hesitate to call.         Sincerely,          Eleazar García MD        CC:   No Recipients

## 2023-08-29 ENCOUNTER — HOSPITAL ENCOUNTER (OUTPATIENT)
Dept: MRI IMAGING | Facility: HOSPITAL | Age: 58
Discharge: HOME | End: 2023-08-29
Admitting: ORTHOPAEDIC SURGERY
Payer: COMMERCIAL

## 2023-08-29 DIAGNOSIS — I10 BENIGN ESSENTIAL HTN: ICD-10-CM

## 2023-08-29 DIAGNOSIS — I87.2 VENOUS INSUFFICIENCY (CHRONIC) (PERIPHERAL): ICD-10-CM

## 2023-08-29 DIAGNOSIS — M23.92 INTERNAL DERANGEMENT OF LEFT KNEE: ICD-10-CM

## 2023-08-29 DIAGNOSIS — M25.562 ACUTE PAIN OF LEFT KNEE: ICD-10-CM

## 2023-08-29 PROCEDURE — 73721 MRI JNT OF LWR EXTRE W/O DYE: CPT

## 2023-08-30 ENCOUNTER — TELEPHONE (OUTPATIENT)
Dept: ORTHOPEDIC SURGERY | Facility: CLINIC | Age: 58
End: 2023-08-30
Payer: COMMERCIAL

## 2023-08-30 DIAGNOSIS — Z13.820 SCREENING FOR OSTEOPOROSIS: Primary | ICD-10-CM

## 2023-08-30 NOTE — TELEPHONE ENCOUNTER
----- Message from Eleazar García MD sent at 8/30/2023  2:29 PM CDT -----  Had degenerative changes but also has insuffiency fracture.   Probably needs to be seen in bone health clinic and probably needs protected weight bearing with cane or walker until pain improves.  We can plan to see her back in 2-3 weeks for possible steroid injection.

## 2023-09-14 ENCOUNTER — OFFICE VISIT (OUTPATIENT)
Dept: ORTHOPEDIC SURGERY | Facility: CLINIC | Age: 58
End: 2023-09-14
Payer: COMMERCIAL

## 2023-09-14 VITALS — BODY MASS INDEX: 32.82 KG/M2 | HEIGHT: 65 IN | WEIGHT: 197 LBS

## 2023-09-14 DIAGNOSIS — M85.80 OSTEOPENIA, UNSPECIFIED LOCATION: ICD-10-CM

## 2023-09-14 DIAGNOSIS — M84.362A STRESS FRACTURE OF LEFT TIBIA, INITIAL ENCOUNTER: ICD-10-CM

## 2023-09-14 DIAGNOSIS — I10 BENIGN ESSENTIAL HTN: ICD-10-CM

## 2023-09-14 DIAGNOSIS — M23.92 INTERNAL DERANGEMENT OF LEFT KNEE: Primary | ICD-10-CM

## 2023-09-14 DIAGNOSIS — M25.562 ACUTE PAIN OF LEFT KNEE: ICD-10-CM

## 2023-09-14 PROCEDURE — 99213 OFFICE O/P EST LOW 20 MIN: CPT | Performed by: ORTHOPAEDIC SURGERY

## 2023-09-14 NOTE — PROGRESS NOTES
"Jefferson Pena is a 58 y.o. female returns for     Chief Complaint   Patient presents with    Left Knee - Follow-up, Pain    Results     Mri done on 8/29/2023, bone density 9/12/2023       HISTORY OF PRESENT ILLNESS:  Pain is better since using the walker.  She is slowly improving.  No new complaints.       CONCURRENT MEDICAL HISTORY:    The following portions of the patient's history were reviewed and updated as appropriate: allergies, current medications, past family history, past medical history, past social history, past surgical history and problem list.     ROS  No fevers or chills.  No chest pain or shortness of air.  No GI or  disturbances.    PHYSICAL EXAMINATION:       Ht 165.1 cm (65\")   Wt 89.4 kg (197 lb)   LMP 07/06/2004 (Within Months)   BMI 32.78 kg/m²     Physical Exam  Constitutional:       General: She is not in acute distress.     Appearance: Normal appearance.   Pulmonary:      Effort: Pulmonary effort is normal. No respiratory distress.   Neurological:      Mental Status: She is alert and oriented to person, place, and time.       GAIT:     []  Normal  [x]  Antalgic    Assistive device: []  None  [x]  Walker     []  Crutches  []  Cane     []  Wheelchair []  Stretcher    Left Knee Exam     Comments:  She has mild diffuse swelling.  Tender to palpation along the lateral tibial plateau.  No tenderness medially.  Good distal pulses and sensation grossly.  Tenderness with range of motion.  Good stability on exam but does have tenderness laterally with varus and valgus testing.            MRI Knee Left Without Contrast    Result Date: 8/30/2023  Narrative: MRI KNEE LEFT WITHOUT CONTRAST REASON FOR EXAM: Pain. COMPARISON: X-ray, 8/6/2023. TECHNIQUE: Routine noncontrast enhanced multiplanar, multisequence MRI of the left knee was obtained. FINDINGS: Complex tearing of the medial meniscus with horizontal oblique tearing at the meniscal body communicating with the inferior articular surface.  There " is a diminutive appearance of the posterior horn although no clear flipped fragment. Lateral meniscus intact.  Mucoid degeneration of the anterior cruciate ligament. Posterior cruciate ligament intact.  Extensor mechanism intact.  Collateral ligaments intact.  Regarding the lateral compartment, there is linear depressed signal of the lateral tibial plateau with marked adjacent edema-like signal. Otherwise grossly unremarkable appearance of the nonarticular bone and soft tissues.  No significant joint effusion or Baker's cyst.  Cartilage appears relatively intact with some scattered signal inhomogeneity.     Impression: 1.  Lateral tibial plateau insufficiency fracture. 2.  Complex medial meniscus tearing with horizontal oblique tearing at the meniscal body extending posteriorly with a more diminutive degenerative appearance of the posterior horn. 3.  Mucoid degeneration of the anterior cruciate ligament with some fibers appearing intact.    DEXA Bone Density Axial    Result Date: 9/12/2023  Narrative: DUAL-ENERGY X-RAY ABSORPTIOMETRY (DEXA) INDICATION: Bone mineral density screening. COMPARISON: No reference studies are available for comparison. FINDINGS: PA LUMBAR SPINE: BMD is 0.851 g/cm2 as determined from L3 and 4. T-score is -2.3  (standard deviations of young adult mean). L1 and L2 excluded secondary to sclerotic degenerative changes. LEFT PROXIMAL FEMUR: BMD is 0.599 g/cm2 as determined from the left femoral neck. T-score is -2.3  (standard deviations of young adult mean). RIGHT PROXIMAL FEMUR: BMD is 0.581 g/cm2 as determined from the right femoral neck. T-score is -2.4  (standard deviations of young adult mean). Utilizing FRAX, patient's ten-year risk of major osteoporotic fracture is 9.5% and risk for a hip fracture is 1.5%.     Impression: Osteopenia. Consider FDA-approved medical therapies in postmenopausal women and men aged 50 years and older, based on the following: -A hip or vertebral (clinical or  morphometric) fracture. -T-score less than or equal to -2.5 at the femoral neck or spine after appropriate evaluation to exclude secondary causes. -Low bone mass (T-score between -1.0 and -2.5 at the femoral neck or spine) and a 10 year probability of a hip fracture of greater than or equal to 3% or a 10 year probability of a major osteoporosis-related fracture greater than or equal to 20% based on the [place]-adapted WHO algorithm. -Clinicians judgement and/or patient preferences may indicate treatment for people with 10 year fracture probabilities above or below these levels. Link for fracture risk calculator:  https://www.shef.ac.uk/FRAX/ The DEXA scan was performed using a [employer] Discovery C unit.            ASSESSMENT:    Diagnoses and all orders for this visit:    Internal derangement of left knee    Acute pain of left knee    Benign essential HTN    Stress fracture of left tibia, initial encounter          PLAN    Patient has a stress fracture in the proximal tibia.  Her symptoms are improving since using the walker.  Bone density test also shows that she has osteopenia.  Osteopenia in light of an insufficiency fracture would warrant further evaluation.  We will have her see the bone health clinic for further evaluation for possible treatment.  For the stress fracture, we will continue with slow gradual progression of weight.  We discussed close chain exercises.  Recheck in 4 weeks with AP and lateral of the left knee.    Return in about 4 weeks (around 10/12/2023) for Recheck with repeat xrays.    Eleazar García MD

## 2023-09-20 ENCOUNTER — TELEPHONE (OUTPATIENT)
Dept: ORTHOPEDIC SURGERY | Facility: CLINIC | Age: 58
End: 2023-09-20
Payer: COMMERCIAL

## 2023-09-20 NOTE — TELEPHONE ENCOUNTER
DR ROCHA.  PATIENT IS REQUESTING A WORK STATEMENT THAT SHE IS OFF WORK UNTIL HER NEXT APPOINTMENT 10/12/2023.

## (undated) DEVICE — BANDAGE,GAUZE,BULKEE II,4.5"X4.1YD,STRL: Brand: MEDLINE

## (undated) DEVICE — PAD,ABDOMINAL,8"X10",ST,LF: Brand: MEDLINE

## (undated) DEVICE — SPNG LAP 18X18IN LF STRL PK/5

## (undated) DEVICE — 3M™ STERI-STRIP™ REINFORCED ADHESIVE SKIN CLOSURES, R1547, 1/2 IN X 4 IN (12 MM X 100 MM), 6 STRIPS/ENVELOPE: Brand: 3M™ STERI-STRIP™

## (undated) DEVICE — ADHS LIQ MASTISOL 2/3ML

## (undated) DEVICE — STERILE POLYISOPRENE POWDER-FREE SURGICAL GLOVES WITH EMOLLIENT COATING: Brand: PROTEXIS

## (undated) DEVICE — GAUZE,SPONGE,4"X4",16PLY,XRAY,STRL,LF: Brand: MEDLINE

## (undated) DEVICE — GOWN,AURORA,NOREINF,RAGLAN,XL,STERILE: Brand: MEDLINE

## (undated) DEVICE — I-KNIFE CUTTING INSTRUMENT 15 DEGREE: Brand: I-KNIFE

## (undated) DEVICE — BNDG ELAS ELITE V/CLOSE 6IN 5YD LF STRL